# Patient Record
Sex: MALE
[De-identification: names, ages, dates, MRNs, and addresses within clinical notes are randomized per-mention and may not be internally consistent; named-entity substitution may affect disease eponyms.]

---

## 2019-12-30 ENCOUNTER — TELEPHONE (OUTPATIENT)
Dept: SCHEDULING | Age: 64
End: 2019-12-30

## 2020-01-15 ENCOUNTER — OFFICE VISIT (OUTPATIENT)
Dept: FAMILY MEDICINE CLINIC | Facility: CLINIC | Age: 65
End: 2020-01-15
Payer: COMMERCIAL

## 2020-01-15 VITALS
WEIGHT: 274 LBS | TEMPERATURE: 98 F | DIASTOLIC BLOOD PRESSURE: 83 MMHG | HEIGHT: 72.5 IN | HEART RATE: 65 BPM | SYSTOLIC BLOOD PRESSURE: 155 MMHG | BODY MASS INDEX: 36.71 KG/M2

## 2020-01-15 DIAGNOSIS — E11.9 TYPE 2 DIABETES MELLITUS WITHOUT COMPLICATION, WITHOUT LONG-TERM CURRENT USE OF INSULIN (HCC): ICD-10-CM

## 2020-01-15 DIAGNOSIS — I25.10 CORONARY ARTERY DISEASE INVOLVING NATIVE HEART WITHOUT ANGINA PECTORIS, UNSPECIFIED VESSEL OR LESION TYPE: Primary | ICD-10-CM

## 2020-01-15 PROCEDURE — 99204 OFFICE O/P NEW MOD 45 MIN: CPT | Performed by: FAMILY MEDICINE

## 2020-01-15 RX ORDER — NEBIVOLOL 10 MG/1
10 TABLET ORAL DAILY
Qty: 90 TABLET | Refills: 2 | Status: SHIPPED | OUTPATIENT
Start: 2020-01-15 | End: 2020-04-28

## 2020-01-15 RX ORDER — CLOPIDOGREL BISULFATE 75 MG/1
75 TABLET ORAL DAILY
COMMUNITY
End: 2020-01-15

## 2020-01-15 RX ORDER — TORSEMIDE 20 MG/1
20 TABLET ORAL DAILY
COMMUNITY
End: 2020-01-15

## 2020-01-15 RX ORDER — NEBIVOLOL 10 MG/1
10 TABLET ORAL DAILY
COMMUNITY
End: 2020-01-15

## 2020-01-15 RX ORDER — LISINOPRIL 20 MG/1
20 TABLET ORAL DAILY
Qty: 90 TABLET | Refills: 2 | Status: SHIPPED | OUTPATIENT
Start: 2020-01-15 | End: 2020-04-28

## 2020-01-15 RX ORDER — CLOPIDOGREL BISULFATE 75 MG/1
75 TABLET ORAL DAILY
Qty: 90 TABLET | Refills: 2 | Status: SHIPPED | OUTPATIENT
Start: 2020-01-15 | End: 2020-04-28

## 2020-01-15 RX ORDER — SIMVASTATIN 20 MG
20 TABLET ORAL NIGHTLY
COMMUNITY
End: 2020-01-15

## 2020-01-15 RX ORDER — TORSEMIDE 20 MG/1
20 TABLET ORAL DAILY
Qty: 30 TABLET | Refills: 1 | Status: SHIPPED | OUTPATIENT
Start: 2020-01-15 | End: 2020-04-28

## 2020-01-15 RX ORDER — SIMVASTATIN 20 MG
20 TABLET ORAL NIGHTLY
Qty: 90 TABLET | Refills: 3 | Status: SHIPPED | OUTPATIENT
Start: 2020-01-15 | End: 2020-04-28

## 2020-01-15 NOTE — PROGRESS NOTES
1/15/2020  12:25 PM    Ty Rodney is a 71year old male. Chief complaint(s): Patient presents with:  Diabetes  Hypertension  New Patient    HPI:     Ty Rodney primary complaint is regarding as above.      Patient Ty Rodney is a 71year old mal Date   • Diabetes [facility])    • Essential hypertension    • Prostate infection 2016      History reviewed. No pertinent surgical history.    Family History   Problem Relation Age of Onset   • Cancer Mother    • Hypertension Sister    • Lipids Sister       Soci Pulse 65   Temp 97.9 °F (36.6 °C)   Ht 6' 0.5\" (1.842 m)   Wt 274 lb (124.3 kg)   BMI 36.65 kg/m²    HENT:   Head: Normocephalic. Eyes: Conjunctivae are normal. No scleral icterus. Neck: Neck supple.    Cardiovascular: Normal rate, regular rhythm, S1 12-LEAD, Orders Placed This Encounter          Cardiology Claudia Aguilar MD           RECOMMENDATIONS given include: Please, call our office with any questions or concerns.  Notify Dr Ayaan Jewell or the [facility] if there is a deterioration or worsening total) by mouth daily. • simvastatin 20 MG Oral Tab 90 tablet 3     Sig: Take 1 tablet (20 mg total) by mouth nightly. • Nebivolol HCl (BYSTOLIC) 10 MG Oral Tab 90 tablet 2     Sig: Take 1 tablet (10 mg total) by mouth daily.    • Clopidogrel Bisulfate torsemide 20 MG Oral Tab 30 tablet 1     Sig: Take 1 tablet (20 mg total) by mouth daily. • simvastatin 20 MG Oral Tab 90 tablet 3     Sig: Take 1 tablet (20 mg total) by mouth nightly.    • Nebivolol HCl (BYSTOLIC) 10 MG Oral Tab 90 tablet 2     Sig: Lissy Nguyen

## 2020-01-23 ENCOUNTER — TELEPHONE (OUTPATIENT)
Dept: FAMILY MEDICINE CLINIC | Facility: CLINIC | Age: 65
End: 2020-01-23

## 2020-01-23 DIAGNOSIS — E11.9 TYPE 2 DIABETES MELLITUS WITHOUT COMPLICATION, UNSPECIFIED WHETHER LONG TERM INSULIN USE (HCC): Primary | ICD-10-CM

## 2020-01-28 NOTE — TELEPHONE ENCOUNTER
Rx called in to [address] for glucometer, test strips and lancet.  Pharmacist took call for a new Rx. patient was advised

## 2020-03-07 ENCOUNTER — APPOINTMENT (OUTPATIENT)
Dept: LAB | Age: 65
End: 2020-03-07
Attending: FAMILY MEDICINE
Payer: MEDICARE

## 2020-03-07 ENCOUNTER — LAB ENCOUNTER (OUTPATIENT)
Dept: LAB | Age: 65
End: 2020-03-07
Attending: FAMILY MEDICINE
Payer: MEDICARE

## 2020-03-07 DIAGNOSIS — I25.10 CORONARY ARTERY DISEASE INVOLVING NATIVE HEART WITHOUT ANGINA PECTORIS, UNSPECIFIED VESSEL OR LESION TYPE: ICD-10-CM

## 2020-03-07 DIAGNOSIS — E11.9 TYPE 2 DIABETES MELLITUS WITHOUT COMPLICATION, WITHOUT LONG-TERM CURRENT USE OF INSULIN (HCC): ICD-10-CM

## 2020-03-07 LAB
ALBUMIN SERPL-MCNC: 4.4 G/DL (ref 3.4–5)
ALBUMIN/GLOB SERPL: 1 {RATIO} (ref 1–2)
ALP LIVER SERPL-CCNC: 94 U/L (ref 45–117)
ALT SERPL-CCNC: 29 U/L (ref 16–61)
ANION GAP SERPL CALC-SCNC: 7 MMOL/L (ref 0–18)
AST SERPL-CCNC: 12 U/L (ref 15–37)
BASOPHILS # BLD AUTO: 0.06 X10(3) UL (ref 0–0.2)
BASOPHILS NFR BLD AUTO: 0.7 %
BILIRUB SERPL-MCNC: 0.4 MG/DL (ref 0.1–2)
BUN BLD-MCNC: 27 MG/DL (ref 7–18)
BUN/CREAT SERPL: 22.7 (ref 10–20)
CALCIUM BLD-MCNC: 9.7 MG/DL (ref 8.5–10.1)
CHLORIDE SERPL-SCNC: 105 MMOL/L (ref 98–112)
CHOLEST SMN-MCNC: 170 MG/DL (ref ?–200)
CO2 SERPL-SCNC: 26 MMOL/L (ref 21–32)
CREAT BLD-MCNC: 1.19 MG/DL (ref 0.7–1.3)
CREAT UR-SCNC: <13 MG/DL
DEPRECATED RDW RBC AUTO: 46.5 FL (ref 35.1–46.3)
EOSINOPHIL # BLD AUTO: 0.57 X10(3) UL (ref 0–0.7)
EOSINOPHIL NFR BLD AUTO: 6.3 %
ERYTHROCYTE [DISTWIDTH] IN BLOOD BY AUTOMATED COUNT: 14.3 % (ref 11–15)
EST. AVERAGE GLUCOSE BLD GHB EST-MCNC: 140 MG/DL (ref 68–126)
GLOBULIN PLAS-MCNC: 4.3 G/DL (ref 2.8–4.4)
GLUCOSE BLD-MCNC: 112 MG/DL (ref 70–99)
HBA1C MFR BLD HPLC: 6.5 % (ref ?–5.7)
HCT VFR BLD AUTO: 47.7 % (ref 39–53)
HDLC SERPL-MCNC: 44 MG/DL (ref 40–59)
HGB BLD-MCNC: 15.5 G/DL (ref 13–17.5)
IMM GRANULOCYTES # BLD AUTO: 0.01 X10(3) UL (ref 0–1)
IMM GRANULOCYTES NFR BLD: 0.1 %
LDLC SERPL CALC-MCNC: 102 MG/DL (ref ?–100)
LYMPHOCYTES # BLD AUTO: 2.48 X10(3) UL (ref 1–4)
LYMPHOCYTES NFR BLD AUTO: 27.3 %
M PROTEIN MFR SERPL ELPH: 8.7 G/DL (ref 6.4–8.2)
MCH RBC QN AUTO: 29.1 PG (ref 26–34)
MCHC RBC AUTO-ENTMCNC: 32.5 G/DL (ref 31–37)
MCV RBC AUTO: 89.5 FL (ref 80–100)
MICROALBUMIN UR-MCNC: <0.5 MG/DL
MONOCYTES # BLD AUTO: 0.67 X10(3) UL (ref 0.1–1)
MONOCYTES NFR BLD AUTO: 7.4 %
NEUTROPHILS # BLD AUTO: 5.28 X10 (3) UL (ref 1.5–7.7)
NEUTROPHILS # BLD AUTO: 5.28 X10(3) UL (ref 1.5–7.7)
NEUTROPHILS NFR BLD AUTO: 58.2 %
NONHDLC SERPL-MCNC: 126 MG/DL (ref ?–130)
OSMOLALITY SERPL CALC.SUM OF ELEC: 292 MOSM/KG (ref 275–295)
PATIENT FASTING Y/N/NP: YES
PATIENT FASTING Y/N/NP: YES
PLATELET # BLD AUTO: 271 10(3)UL (ref 150–450)
POTASSIUM SERPL-SCNC: 4.3 MMOL/L (ref 3.5–5.1)
RBC # BLD AUTO: 5.33 X10(6)UL (ref 3.8–5.8)
SODIUM SERPL-SCNC: 138 MMOL/L (ref 136–145)
TRIGL SERPL-MCNC: 120 MG/DL (ref 30–149)
VLDLC SERPL CALC-MCNC: 24 MG/DL (ref 0–30)
WBC # BLD AUTO: 9.1 X10(3) UL (ref 4–11)

## 2020-03-07 PROCEDURE — 80053 COMPREHEN METABOLIC PANEL: CPT

## 2020-03-07 PROCEDURE — 36415 COLL VENOUS BLD VENIPUNCTURE: CPT

## 2020-03-07 PROCEDURE — 80061 LIPID PANEL: CPT

## 2020-03-07 PROCEDURE — 83036 HEMOGLOBIN GLYCOSYLATED A1C: CPT

## 2020-03-07 PROCEDURE — 93010 ELECTROCARDIOGRAM REPORT: CPT | Performed by: FAMILY MEDICINE

## 2020-03-07 PROCEDURE — 82043 UR ALBUMIN QUANTITATIVE: CPT

## 2020-03-07 PROCEDURE — 85025 COMPLETE CBC W/AUTO DIFF WBC: CPT

## 2020-03-07 PROCEDURE — 82570 ASSAY OF URINE CREATININE: CPT

## 2020-03-07 PROCEDURE — 93005 ELECTROCARDIOGRAM TRACING: CPT

## 2020-03-16 DIAGNOSIS — I25.10 CORONARY ARTERY DISEASE INVOLVING NATIVE HEART WITHOUT ANGINA PECTORIS, UNSPECIFIED VESSEL OR LESION TYPE: ICD-10-CM

## 2020-04-28 ENCOUNTER — OFFICE VISIT (OUTPATIENT)
Dept: FAMILY MEDICINE CLINIC | Facility: CLINIC | Age: 65
End: 2020-04-28
Payer: COMMERCIAL

## 2020-04-28 VITALS
WEIGHT: 271.63 LBS | BODY MASS INDEX: 36 KG/M2 | DIASTOLIC BLOOD PRESSURE: 76 MMHG | SYSTOLIC BLOOD PRESSURE: 125 MMHG | HEART RATE: 60 BPM

## 2020-04-28 DIAGNOSIS — E11.9 TYPE 2 DIABETES MELLITUS WITHOUT COMPLICATION, UNSPECIFIED WHETHER LONG TERM INSULIN USE (HCC): Primary | ICD-10-CM

## 2020-04-28 DIAGNOSIS — I25.10 CORONARY ARTERY DISEASE INVOLVING NATIVE HEART WITHOUT ANGINA PECTORIS, UNSPECIFIED VESSEL OR LESION TYPE: ICD-10-CM

## 2020-04-28 PROCEDURE — 99213 OFFICE O/P EST LOW 20 MIN: CPT | Performed by: FAMILY MEDICINE

## 2020-04-28 RX ORDER — BLOOD SUGAR DIAGNOSTIC
1 STRIP MISCELLANEOUS 2 TIMES DAILY
Qty: 100 STRIP | Refills: 2 | Status: SHIPPED | OUTPATIENT
Start: 2020-04-28 | End: 2021-02-05

## 2020-04-28 RX ORDER — NEBIVOLOL 10 MG/1
10 TABLET ORAL DAILY
Qty: 90 TABLET | Refills: 2 | Status: SHIPPED | OUTPATIENT
Start: 2020-04-28 | End: 2020-08-24

## 2020-04-28 RX ORDER — LANCETS
1 EACH MISCELLANEOUS 2 TIMES DAILY
COMMUNITY
Start: 2020-01-28 | End: 2021-02-05

## 2020-04-28 RX ORDER — EMPAGLIFLOZIN AND METFORMIN HYDROCHLORIDE 12.5; 1 MG/1; MG/1
1 TABLET ORAL 2 TIMES DAILY
Qty: 60 TABLET | Refills: 3 | Status: SHIPPED | OUTPATIENT
Start: 2020-04-28 | End: 2020-08-24

## 2020-04-28 RX ORDER — CLOPIDOGREL BISULFATE 75 MG/1
75 TABLET ORAL DAILY
Qty: 90 TABLET | Refills: 2 | Status: SHIPPED | OUTPATIENT
Start: 2020-04-28 | End: 2021-02-05

## 2020-04-28 RX ORDER — LISINOPRIL 20 MG/1
20 TABLET ORAL DAILY
Qty: 90 TABLET | Refills: 2 | Status: SHIPPED | OUTPATIENT
Start: 2020-04-28 | End: 2020-08-24

## 2020-04-28 RX ORDER — EMPAGLIFLOZIN AND METFORMIN HYDROCHLORIDE 12.5; 1 MG/1; MG/1
TABLET ORAL 2 TIMES DAILY
COMMUNITY
Start: 2020-04-15 | End: 2020-04-28

## 2020-04-28 RX ORDER — AMLODIPINE BESYLATE 10 MG/1
10 TABLET ORAL DAILY
Qty: 90 TABLET | Refills: 2 | Status: SHIPPED | OUTPATIENT
Start: 2020-04-28 | End: 2020-08-24

## 2020-04-28 RX ORDER — SIMVASTATIN 20 MG
20 TABLET ORAL NIGHTLY
Qty: 90 TABLET | Refills: 3 | Status: SHIPPED | OUTPATIENT
Start: 2020-04-28 | End: 2020-08-24

## 2020-04-28 RX ORDER — TORSEMIDE 20 MG/1
20 TABLET ORAL DAILY
Qty: 30 TABLET | Refills: 1 | Status: SHIPPED | OUTPATIENT
Start: 2020-04-28 | End: 2020-08-26

## 2020-04-28 RX ORDER — METHOCARBAMOL 750 MG/1
750 TABLET, FILM COATED ORAL 3 TIMES DAILY
COMMUNITY
Start: 2019-04-15 | End: 2021-10-19

## 2020-04-28 RX ORDER — BLOOD SUGAR DIAGNOSTIC
1 STRIP MISCELLANEOUS 2 TIMES DAILY
COMMUNITY
Start: 2020-04-24 | End: 2020-04-28

## 2020-04-28 NOTE — PROGRESS NOTES
4/28/2020  3:56 PM    John Paul James is a 71year old male. Chief complaint(s): Patient presents with:  Medication Request: needs refills on his medications     HPI:     John Paul James primary complaint is regarding CAD, DM.      Patient John Paul James is a being active in regards to physical activity. Has not been to see cardiologist.      HISTORY:  Past Medical History:   Diagnosis Date   • Diabetes [facility])    • Essential hypertension    • Prostate infection 2016      No past surgical history on file.    Bogdan Crawford Eyes: Negative for visual disturbance. Respiratory: Negative for shortness of breath and wheezing. Cardiovascular: Negative for chest pain and palpitations. Endocrine: Negative for polydipsia and polyuria.    Musculoskeletal: Negative for back pain 37 U/L    Alkaline Phosphatase 94 45 - 117 U/L    Bilirubin, Total 0.4 0.1 - 2.0 mg/dL    Total Protein 8.7 (H) 6.4 - 8.2 g/dL    Albumin 4.4 3.4 - 5.0 g/dL    Globulin  4.3 2.8 - 4.4 g/dL    A/G Ratio 1.0 1.0 - 2.0    FASTING Yes    MICROALB/CREAT RATIO, simvastatin 20 MG Oral Tab, Take 1 tablet (20 mg total) by mouth nightly., Disp: 90 tablet, Rfl: 3  •  Nebivolol HCl (BYSTOLIC) 10 MG Oral Tab, Take 1 tablet (10 mg total) by mouth daily. , Disp: 90 tablet, Rfl: 2  •  lisinopril 20 MG Oral Tab, Take 1 table Si lancet by Finger stick route 2 (two) times daily.       RECOMMENDATIONS: instructed in use of glucometer ( check fasting glucose daily), return for training in administering insulin injections, adherence to an 1800 calorie ADA diet,  10 pound PARKER Peterson Disp Refills   • torsemide 20 MG Oral Tab 30 tablet 1     Sig: Take 1 tablet (20 mg total) by mouth daily. • simvastatin 20 MG Oral Tab 90 tablet 3     Sig: Take 1 tablet (20 mg total) by mouth nightly.    • Nebivolol HCl (BYSTOLIC) 10 MG Oral Tab 90 tabl Take 1 tablet (75 mg total) by mouth daily. • SYNJARDY 12.5-1000 MG Oral Tab 60 tablet 3     Sig: Take 1 tablet by mouth 2 (two) times daily. • amLODIPine Besylate 10 MG Oral Tab 90 tablet 2     Sig: Take 1 tablet (10 mg total) by mouth daily.    • Gluc

## 2020-05-01 RX ORDER — TORSEMIDE 20 MG/1
TABLET ORAL
Qty: 30 TABLET | Refills: 1 | OUTPATIENT
Start: 2020-05-01

## 2020-08-24 ENCOUNTER — TELEPHONE (OUTPATIENT)
Dept: FAMILY MEDICINE CLINIC | Facility: CLINIC | Age: 65
End: 2020-08-24

## 2020-08-24 DIAGNOSIS — I25.10 CORONARY ARTERY DISEASE INVOLVING NATIVE HEART WITHOUT ANGINA PECTORIS, UNSPECIFIED VESSEL OR LESION TYPE: ICD-10-CM

## 2020-08-24 RX ORDER — AMLODIPINE BESYLATE 10 MG/1
10 TABLET ORAL DAILY
Qty: 90 TABLET | Refills: 2 | Status: SHIPPED | OUTPATIENT
Start: 2020-08-24 | End: 2021-02-05

## 2020-08-24 RX ORDER — NEBIVOLOL 10 MG/1
10 TABLET ORAL DAILY
Qty: 90 TABLET | Refills: 2 | Status: SHIPPED | OUTPATIENT
Start: 2020-08-24 | End: 2021-02-05

## 2020-08-24 RX ORDER — EMPAGLIFLOZIN AND METFORMIN HYDROCHLORIDE 12.5; 1 MG/1; MG/1
1 TABLET ORAL 2 TIMES DAILY
Qty: 60 TABLET | Refills: 1 | Status: SHIPPED | OUTPATIENT
Start: 2020-08-24 | End: 2020-11-03

## 2020-08-24 RX ORDER — SIMVASTATIN 20 MG
20 TABLET ORAL NIGHTLY
Qty: 90 TABLET | Refills: 3 | Status: SHIPPED | OUTPATIENT
Start: 2020-08-24 | End: 2021-02-05

## 2020-08-24 RX ORDER — LISINOPRIL 20 MG/1
20 TABLET ORAL DAILY
Qty: 90 TABLET | Refills: 2 | Status: SHIPPED | OUTPATIENT
Start: 2020-08-24 | End: 2021-02-05

## 2020-08-26 RX ORDER — TORSEMIDE 20 MG/1
TABLET ORAL
Qty: 30 TABLET | Refills: 1 | Status: SHIPPED | OUTPATIENT
Start: 2020-08-26 | End: 2020-11-03

## 2020-11-02 DIAGNOSIS — I25.10 CORONARY ARTERY DISEASE INVOLVING NATIVE HEART WITHOUT ANGINA PECTORIS, UNSPECIFIED VESSEL OR LESION TYPE: ICD-10-CM

## 2020-11-03 RX ORDER — TORSEMIDE 20 MG/1
TABLET ORAL
Qty: 30 TABLET | Refills: 1 | Status: SHIPPED | OUTPATIENT
Start: 2020-11-03 | End: 2021-02-05

## 2020-11-03 RX ORDER — EMPAGLIFLOZIN AND METFORMIN HYDROCHLORIDE 12.5; 1 MG/1; MG/1
TABLET ORAL
Qty: 60 TABLET | Refills: 1 | Status: SHIPPED | OUTPATIENT
Start: 2020-11-03 | End: 2021-02-05

## 2020-12-09 ENCOUNTER — OFFICE VISIT (OUTPATIENT)
Dept: CARDIOLOGY CLINIC | Facility: CLINIC | Age: 65
End: 2020-12-09
Payer: COMMERCIAL

## 2020-12-09 DIAGNOSIS — I10 HYPERTENSION, UNSPECIFIED TYPE: Primary | ICD-10-CM

## 2020-12-09 DIAGNOSIS — R06.00 DYSPNEA ON EXERTION: ICD-10-CM

## 2020-12-09 PROBLEM — R06.09 DYSPNEA ON EXERTION: Status: ACTIVE | Noted: 2020-12-09

## 2020-12-09 PROCEDURE — 99205 OFFICE O/P NEW HI 60 MIN: CPT | Performed by: INTERNAL MEDICINE

## 2020-12-09 PROCEDURE — 93000 ELECTROCARDIOGRAM COMPLETE: CPT | Performed by: INTERNAL MEDICINE

## 2020-12-09 NOTE — PROGRESS NOTES
Cardiology Consult Note    12/9/2020    Bharath Bean is a 76year old male. HPI:   75 yo male hx of HTN, DM obesity. Has BRADY, 2 years ago in [place] had chest pain went to a hospital was told He had heart damage and was started on plavix. No orthopena.  Ольга Tyler GENERAL HEALTH: feels well otherwise  SKIN: denies any unusual skin lesions or rashes  RESPIRATORY:   shortness of breath with exertion  CARDIOVASCULAR: See HPI  GI: denies abdominal pain and denies heartburn  NEURO: denies headaches  All other systems r

## 2020-12-11 ENCOUNTER — TELEPHONE (OUTPATIENT)
Dept: CARDIOLOGY CLINIC | Facility: CLINIC | Age: 65
End: 2020-12-11

## 2020-12-11 NOTE — TELEPHONE ENCOUNTER
Nurse Reviewer Katy Salas from [employer] left a vm saying the case can not be auth'd at the NR level and is asking if the case can be withdrawn or should the case be forwarded to the MD for review.   The following are the reasons the case can not be auth'd

## 2020-12-17 ENCOUNTER — OFFICE VISIT (OUTPATIENT)
Dept: FAMILY MEDICINE CLINIC | Facility: CLINIC | Age: 65
End: 2020-12-17
Payer: COMMERCIAL

## 2020-12-17 VITALS
DIASTOLIC BLOOD PRESSURE: 86 MMHG | HEART RATE: 55 BPM | SYSTOLIC BLOOD PRESSURE: 139 MMHG | HEIGHT: 72.5 IN | WEIGHT: 278 LBS | BODY MASS INDEX: 37.25 KG/M2

## 2020-12-17 DIAGNOSIS — G47.31 PRIMARY CENTRAL SLEEP APNEA: ICD-10-CM

## 2020-12-17 DIAGNOSIS — I10 ESSENTIAL HYPERTENSION: ICD-10-CM

## 2020-12-17 DIAGNOSIS — G56.02 CARPAL TUNNEL SYNDROME OF LEFT WRIST: Primary | ICD-10-CM

## 2020-12-17 PROCEDURE — 3079F DIAST BP 80-89 MM HG: CPT | Performed by: FAMILY MEDICINE

## 2020-12-17 PROCEDURE — 3008F BODY MASS INDEX DOCD: CPT | Performed by: FAMILY MEDICINE

## 2020-12-17 PROCEDURE — 99214 OFFICE O/P EST MOD 30 MIN: CPT | Performed by: FAMILY MEDICINE

## 2020-12-17 PROCEDURE — 3075F SYST BP GE 130 - 139MM HG: CPT | Performed by: FAMILY MEDICINE

## 2020-12-17 NOTE — PROGRESS NOTES
12/17/2020  11:12 AM    Enid Bhatt is a 76year old male. Chief complaint(s): Patient presents with:  Wrist Pain: x 1 month left wrist and hand pain  apnea  HPI:     Enid Bhatt primary complaint is regarding multiple complainbts.      Patient is a total) by mouth nightly. 90 tablet 3   • Nebivolol HCl (BYSTOLIC) 10 MG Oral Tab Take 1 tablet (10 mg total) by mouth daily. 90 tablet 2   • lisinopril 20 MG Oral Tab Take 1 tablet (20 mg total) by mouth daily.  90 tablet 2   • amLODIPine Besylate 10 MG Ora wrist  (primary encounter diagnosis)  Primary central sleep apnea  Essential hypertension    1.  Carpal tunnel syndrome of left wrist    MEDICATIONS:     Requested Prescriptions     Signed Prescriptions Disp Refills   • Diclofenac Sodium 50 MG Oral Tab EC 2 take medication as prescribed, try not to miss doses, smoking cessation, weight loss, and stress reduction. FOLLOW-UP: Schedule a follow-up visit in [address] [place] [address]. Orders This Visit:  No orders of the defined types were placed in this encounter.       Me

## 2021-01-04 ENCOUNTER — TELEPHONE (OUTPATIENT)
Dept: FAMILY MEDICINE CLINIC | Facility: CLINIC | Age: 66
End: 2021-01-04

## 2021-01-04 DIAGNOSIS — G56.02 CARPAL TUNNEL SYNDROME OF LEFT WRIST: Primary | ICD-10-CM

## 2021-01-04 NOTE — TELEPHONE ENCOUNTER
Dr. Andreina Howe, patient was told by Neurology department to request a referral in order to have EMG completed.  Please advise

## 2021-01-05 NOTE — TELEPHONE ENCOUNTER
Dr. Gina Christianson, patient was told by Neurology to see specialist first for EMG. Referral is needed.

## 2021-01-05 NOTE — TELEPHONE ENCOUNTER
Good morning, So we don't need to do a separate referral for consultation in addition to the EMG test itself. I have done this, this way for the past 5 years. Spoke with the Neurology Department and I was reassured that it is not necessary.  They can just d

## 2021-01-06 ENCOUNTER — HOSPITAL ENCOUNTER (OUTPATIENT)
Dept: CV DIAGNOSTICS | Facility: HOSPITAL | Age: 66
Discharge: HOME | End: 2021-01-06
Attending: INTERNAL MEDICINE
Payer: MEDICARE

## 2021-01-06 DIAGNOSIS — I10 HYPERTENSION, UNSPECIFIED TYPE: ICD-10-CM

## 2021-01-06 DIAGNOSIS — R06.00 DYSPNEA ON EXERTION: ICD-10-CM

## 2021-01-06 PROCEDURE — 93306 TTE W/DOPPLER COMPLETE: CPT | Performed by: INTERNAL MEDICINE

## 2021-01-07 ENCOUNTER — OFFICE VISIT (OUTPATIENT)
Dept: SLEEP CENTER | Age: 66
End: 2021-01-07
Attending: FAMILY MEDICINE
Payer: MEDICARE

## 2021-01-07 DIAGNOSIS — G47.31 PRIMARY CENTRAL SLEEP APNEA: ICD-10-CM

## 2021-01-07 PROCEDURE — 95806 SLEEP STUDY UNATT&RESP EFFT: CPT

## 2021-01-09 ENCOUNTER — HOSPITAL ENCOUNTER (OUTPATIENT)
Dept: CV DIAGNOSTICS | Facility: HOSPITAL | Age: 66
Discharge: HOME | End: 2021-01-09
Attending: INTERNAL MEDICINE
Payer: MEDICARE

## 2021-01-09 ENCOUNTER — HOSPITAL ENCOUNTER (OUTPATIENT)
Dept: NUCLEAR MEDICINE | Facility: HOSPITAL | Age: 66
Discharge: HOME | End: 2021-01-09
Attending: INTERNAL MEDICINE
Payer: MEDICARE

## 2021-01-09 DIAGNOSIS — R06.00 DYSPNEA ON EXERTION: ICD-10-CM

## 2021-01-09 PROCEDURE — 93017 CV STRESS TEST TRACING ONLY: CPT | Performed by: INTERNAL MEDICINE

## 2021-01-09 PROCEDURE — 78452 HT MUSCLE IMAGE SPECT MULT: CPT | Performed by: INTERNAL MEDICINE

## 2021-01-09 PROCEDURE — 93016 CV STRESS TEST SUPVJ ONLY: CPT | Performed by: INTERNAL MEDICINE

## 2021-01-09 PROCEDURE — 93018 CV STRESS TEST I&R ONLY: CPT | Performed by: INTERNAL MEDICINE

## 2021-01-10 NOTE — PROCEDURES
[facility]  Accredited by the [employer] (AASM)    PATIENT'S NAME: [email or web address]   ATTENDING PHYSICIAN: Cherise Hutton MD   REFERRING PHYSICIAN: Cherise Hutton MD   PATIENT ACCOUNT #: [de-identified] LOCATION: [facility] The data generated from this study is consistent with very severe obstructive sleep apnea (ICD-10 code G47.33). RECOMMENDATIONS:    1. CPAP titration. 2.   Weight loss. 3.   Avoid alcohol. 4.   Avoid sedating drug.   5.   The patient should not driv

## 2021-01-11 ENCOUNTER — TELEPHONE (OUTPATIENT)
Dept: FAMILY MEDICINE CLINIC | Facility: CLINIC | Age: 66
End: 2021-01-11

## 2021-01-11 DIAGNOSIS — G47.33 OSA (OBSTRUCTIVE SLEEP APNEA): Primary | ICD-10-CM

## 2021-01-11 NOTE — TELEPHONE ENCOUNTER
Candy/  of [facility] ([id number] [facility]) is requesting that order for home sleep study be updated to CPAP Titration Sleep Study for patient.

## 2021-01-13 ENCOUNTER — TELEPHONE (OUTPATIENT)
Dept: CARDIOLOGY CLINIC | Facility: CLINIC | Age: 66
End: 2021-01-13

## 2021-01-13 NOTE — TELEPHONE ENCOUNTER
Rashaad Gregory APRN P Em Cardio Clinical Staff             Stress test was normal, continue present management. Spoke with  Adrian Thrasher, discussed normal test result and instruction to continue present management.  Reminded of follow up appoi

## 2021-01-28 ENCOUNTER — ORDER TRANSCRIPTION (OUTPATIENT)
Dept: SLEEP CENTER | Age: 66
End: 2021-01-28

## 2021-01-28 ENCOUNTER — PROCEDURE VISIT (OUTPATIENT)
Dept: NEUROLOGY | Facility: CLINIC | Age: 66
End: 2021-01-28
Payer: MEDICARE

## 2021-01-28 VITALS — HEIGHT: 75 IN | WEIGHT: 278 LBS | BODY MASS INDEX: 34.57 KG/M2

## 2021-01-28 DIAGNOSIS — G47.33 OBSTRUCTIVE SLEEP APNEA (ADULT) (PEDIATRIC): Primary | ICD-10-CM

## 2021-01-28 DIAGNOSIS — G56.22 ULNAR NEUROPATHY AT WRIST, LEFT: Primary | ICD-10-CM

## 2021-01-28 PROCEDURE — 95886 MUSC TEST DONE W/N TEST COMP: CPT | Performed by: PHYSICAL MEDICINE & REHABILITATION

## 2021-01-28 PROCEDURE — 3008F BODY MASS INDEX DOCD: CPT | Performed by: PHYSICAL MEDICINE & REHABILITATION

## 2021-01-28 PROCEDURE — 95909 NRV CNDJ TST 5-6 STUDIES: CPT | Performed by: PHYSICAL MEDICINE & REHABILITATION

## 2021-01-28 NOTE — PROGRESS NOTES
[address]  Electromyography Consultation      History of Present Illness:    Dear Dr. Diann Arndt,  Thank you for the opportunity to see Shun Aguillon for electrodiagnostic consultation today.  As you know the • Accu-Chek Softclix Lancets Does not apply Misc 1 lancet by Finger stick route 2 (two) times daily. • methocarbamol 750 MG Oral Tab Take 750 mg by mouth 3 (three) times daily.      • Clopidogrel Bisulfate (PLAVIX) 75 MG Oral Tab Take 1 tablet (75 mg to Ref.  Ref. 4.40 4.0          Elbow 22 Elbow - Wrist 7.60 7.2 55.7 91.5 5.90 27.2      Ref. Ref. 49.0      L ULNAR - ADM      Wrist 8 Wrist - ADM 2.70 9.1  100 6.00 27.9      Ref. Ref. 4.20 5.0          B. Elbow 24.5 B. Elbow - Wrist 6.85 8.3 59.0 90.9 1. Ulnar neuropathy at wrist, left  The EMG does not support carpal tunnel syndrome or cervical radiculopathy. He has ulnar neuropathy at the wrist with sensory only findings which has been substantially improved with diclofenac.   There is no motor involv

## 2021-02-01 DIAGNOSIS — Z23 NEED FOR VACCINATION: ICD-10-CM

## 2021-02-05 ENCOUNTER — OFFICE VISIT (OUTPATIENT)
Dept: FAMILY MEDICINE CLINIC | Facility: CLINIC | Age: 66
End: 2021-02-05
Payer: COMMERCIAL

## 2021-02-05 VITALS
BODY MASS INDEX: 36.44 KG/M2 | DIASTOLIC BLOOD PRESSURE: 81 MMHG | SYSTOLIC BLOOD PRESSURE: 128 MMHG | HEIGHT: 72.5 IN | WEIGHT: 272 LBS | HEART RATE: 62 BPM

## 2021-02-05 DIAGNOSIS — I10 ESSENTIAL HYPERTENSION: ICD-10-CM

## 2021-02-05 DIAGNOSIS — I25.10 CORONARY ARTERY DISEASE INVOLVING NATIVE HEART WITHOUT ANGINA PECTORIS, UNSPECIFIED VESSEL OR LESION TYPE: ICD-10-CM

## 2021-02-05 DIAGNOSIS — E11.9 TYPE 2 DIABETES MELLITUS WITHOUT COMPLICATION, UNSPECIFIED WHETHER LONG TERM INSULIN USE (HCC): Primary | ICD-10-CM

## 2021-02-05 PROCEDURE — 3074F SYST BP LT 130 MM HG: CPT | Performed by: FAMILY MEDICINE

## 2021-02-05 PROCEDURE — 99214 OFFICE O/P EST MOD 30 MIN: CPT | Performed by: FAMILY MEDICINE

## 2021-02-05 PROCEDURE — 3008F BODY MASS INDEX DOCD: CPT | Performed by: FAMILY MEDICINE

## 2021-02-05 PROCEDURE — 3079F DIAST BP 80-89 MM HG: CPT | Performed by: FAMILY MEDICINE

## 2021-02-05 RX ORDER — SIMVASTATIN 20 MG
20 TABLET ORAL NIGHTLY
Qty: 90 TABLET | Refills: 3 | Status: SHIPPED | OUTPATIENT
Start: 2021-02-05 | End: 2021-05-11

## 2021-02-05 RX ORDER — AMLODIPINE BESYLATE 10 MG/1
10 TABLET ORAL DAILY
Qty: 90 TABLET | Refills: 2 | Status: SHIPPED | OUTPATIENT
Start: 2021-02-05 | End: 2021-05-11

## 2021-02-05 RX ORDER — EMPAGLIFLOZIN AND METFORMIN HYDROCHLORIDE 12.5; 1 MG/1; MG/1
1 TABLET ORAL 2 TIMES DAILY
Qty: 60 TABLET | Refills: 3 | Status: SHIPPED | OUTPATIENT
Start: 2021-02-05 | End: 2021-05-11

## 2021-02-05 RX ORDER — TORSEMIDE 20 MG/1
20 TABLET ORAL DAILY
Qty: 30 TABLET | Refills: 3 | Status: SHIPPED | OUTPATIENT
Start: 2021-02-05 | End: 2021-05-11

## 2021-02-05 RX ORDER — LISINOPRIL 20 MG/1
20 TABLET ORAL DAILY
Qty: 90 TABLET | Refills: 2 | Status: SHIPPED | OUTPATIENT
Start: 2021-02-05 | End: 2021-05-11

## 2021-02-05 RX ORDER — BLOOD SUGAR DIAGNOSTIC
1 STRIP MISCELLANEOUS 2 TIMES DAILY
Qty: 100 STRIP | Refills: 2 | Status: SHIPPED | OUTPATIENT
Start: 2021-02-05

## 2021-02-05 RX ORDER — LANCETS
1 EACH MISCELLANEOUS 2 TIMES DAILY
Qty: 100 EACH | Refills: 2 | Status: SHIPPED | OUTPATIENT
Start: 2021-02-05

## 2021-02-05 RX ORDER — NEBIVOLOL 10 MG/1
10 TABLET ORAL DAILY
Qty: 90 TABLET | Refills: 2 | Status: SHIPPED | OUTPATIENT
Start: 2021-02-05 | End: 2021-05-11

## 2021-02-05 RX ORDER — CLOPIDOGREL BISULFATE 75 MG/1
75 TABLET ORAL DAILY
Qty: 90 TABLET | Refills: 2 | Status: SHIPPED | OUTPATIENT
Start: 2021-02-05 | End: 2021-05-11

## 2021-02-05 NOTE — PROGRESS NOTES
2/5/2021  10:51 AM    Jason Garrison is a 76year old male. Chief complaint(s): Patient presents with:  Diabetes    HPI:     Jason Garrison primary complaint is regarding as above.      Patient Tan Beasley is a 76year old male is here to be evaluated fo activity. Has been to see cardiologist.    HISTORY:  Past Medical History:   Diagnosis Date   • Diabetes [facility])    • Essential hypertension    • Prostate infection 2016      No past surgical history on file.    Family History   Problem Relation Age of Onset Review of Systems   Constitutional: Negative for chills, fatigue and fever. Eyes: Negative for visual disturbance. Respiratory: Negative for shortness of breath and wheezing. Cardiovascular: Negative for chest pain and palpitations.    Endocrine: N FRACTION: 65 %  PERFUSION IMAGING: The myocardial perfusion images post pharmacologic stress demonstrate normal myocardial perfusion. The resting images demonstrate normal myocardial perfusion.           CONCLUSION: Normal regadenoson (Lexiscan) myocardial study was available for comparison. ------------------------------------------------------------------- Study data:  No prior study was available for comparison. Study status:  Elective. Procedure:  Transthoracic echocardiography.  The study was technical was no evidence for stenosis. No regurgitation. Tricuspid valve:   Structurally normal valve. Mobility was not restricted. Doppler: There was no evidence for stenosis. Trivial regurgitation. Pulmonary artery:   Normal pulmonary artery pressure.  Right ---------  Stroke index (SV/bsa), LVOT DP              38    ml/m^2 ---------   Aorta                                       Value        Reference  Aortic root ID                              3.9   cm     &lt;4.6   Left atrium insulin use ([facility] [address]  DIABETES A&P    LABORATORY & ORDERS: Blood test(s) ordered today ; Orders Placed This Encounter      ** Hemoglobin A1C  [E]      ** Lipid Panel [E]      **CBC W Differential W Platelet [E]      **CMP  Comp Metabolic Panel (14) [E]      * mouth daily. • lisinopril 20 MG Oral Tab 90 tablet 2     Sig: Take 1 tablet (20 mg total) by mouth daily. • Nebivolol HCl (BYSTOLIC) 10 MG Oral Tab 90 tablet 2     Sig: Take 1 tablet (10 mg total) by mouth daily.    • simvastatin 20 MG Oral Tab 90 table hypertension    MEDICATIONS:   Requested Prescriptions     Signed Prescriptions Disp Refills   • amLODIPine Besylate 10 MG Oral Tab 90 tablet 2     Sig: Take 1 tablet (10 mg total) by mouth daily.    • Clopidogrel Bisulfate (PLAVIX) 75 MG Oral Tab 90 tablet Microalb/Creat Ratio, Random Urine      Meds This Visit:  Requested Prescriptions     Signed Prescriptions Disp Refills   • amLODIPine Besylate 10 MG Oral Tab 90 tablet 2     Sig: Take 1 tablet (10 mg total) by mouth daily.    • Clopidogrel Bisulfate (PLAVI

## 2021-02-10 ENCOUNTER — OFFICE VISIT (OUTPATIENT)
Dept: CARDIOLOGY CLINIC | Facility: CLINIC | Age: 66
End: 2021-02-10
Payer: COMMERCIAL

## 2021-02-10 DIAGNOSIS — R06.00 DYSPNEA ON EXERTION: ICD-10-CM

## 2021-02-10 DIAGNOSIS — I10 HYPERTENSION, UNSPECIFIED TYPE: Primary | ICD-10-CM

## 2021-02-10 PROCEDURE — 99214 OFFICE O/P EST MOD 30 MIN: CPT | Performed by: INTERNAL MEDICINE

## 2021-02-10 NOTE — PROGRESS NOTES
Cardiology Follow Up    Waqar Junior is a 76year old male. Patient presents with: Follow - Up  Hypertension    HPI:   50-year-old male prior history of diabetes, hypertension hyperlipidemia questionable CAD here for follow-up visit.   Last visit ordered n Diabetes [facility])    • Essential hypertension    • Prostate infection 2016      Social History:  Social History    Tobacco Use      Smoking status: Former Smoker      Smokeless tobacco: Never Used    Alcohol use: Yes      Frequency: Monthly or less      Comme

## 2021-02-11 ENCOUNTER — IMMUNIZATION (OUTPATIENT)
Dept: LAB | Age: 66
End: 2021-02-11
Attending: HOSPITALIST
Payer: MEDICARE

## 2021-02-11 DIAGNOSIS — Z23 NEED FOR VACCINATION: Primary | ICD-10-CM

## 2021-02-11 PROCEDURE — 0001A SARSCOV2 VAC 30MCG/0.3ML IM: CPT

## 2021-03-01 ENCOUNTER — LAB ENCOUNTER (OUTPATIENT)
Dept: LAB | Age: 66
End: 2021-03-01
Attending: FAMILY MEDICINE
Payer: MEDICARE

## 2021-03-01 DIAGNOSIS — G47.33 OBSTRUCTIVE SLEEP APNEA (ADULT) (PEDIATRIC): ICD-10-CM

## 2021-03-01 LAB — SARS-COV-2 RNA RESP QL NAA+PROBE: NOT DETECTED

## 2021-03-03 ENCOUNTER — OFFICE VISIT (OUTPATIENT)
Dept: SLEEP CENTER | Age: 66
End: 2021-03-03
Attending: FAMILY MEDICINE
Payer: MEDICARE

## 2021-03-03 DIAGNOSIS — G47.33 OBSTRUCTIVE SLEEP APNEA SYNDROME: Primary | ICD-10-CM

## 2021-03-03 PROCEDURE — 95811 POLYSOM 6/>YRS CPAP 4/> PARM: CPT

## 2021-03-05 ENCOUNTER — IMMUNIZATION (OUTPATIENT)
Dept: LAB | Age: 66
End: 2021-03-05
Attending: HOSPITALIST
Payer: MEDICARE

## 2021-03-05 DIAGNOSIS — Z23 NEED FOR VACCINATION: Primary | ICD-10-CM

## 2021-03-05 PROCEDURE — 0002A SARSCOV2 VAC 30MCG/0.3ML IM: CPT

## 2021-03-07 NOTE — PROCEDURES
[facility]  Accredited by the [employer] (AASM)    PATIENT'S NAME: [email or web address]   ATTENDING PHYSICIAN: Cris Reyna MD   REFERRING PHYSICIAN: Cris Reyna MD   PATIENT ACCOUNT #: [de-identified] LOCATION: [facility] at 5 CWP and titrated upward to 7 CWP. On that final value, the apnea plus hypopnea index was 28.8 events per hour and the lowest desaturation was to 87%. Sleep architecture was severely fragmented with multiple awakenings after sleep onset.   After about

## 2021-03-12 ENCOUNTER — TELEPHONE (OUTPATIENT)
Dept: FAMILY MEDICINE CLINIC | Facility: CLINIC | Age: 66
End: 2021-03-12

## 2021-03-12 DIAGNOSIS — G47.33 OSA (OBSTRUCTIVE SLEEP APNEA): Primary | ICD-10-CM

## 2021-03-12 NOTE — TELEPHONE ENCOUNTER
DME order has been corrected. Demographics, Insurance, DME order, results and office notes faxed to [place].

## 2021-03-12 NOTE — TELEPHONE ENCOUNTER
----- Message from Blade Barraza RN sent at 3/11/2021  6:51 PM CST -----    ----- Message -----  From: Luis Dodson MD  Sent: 3/7/2021   8:27 PM CST  To: Em Rn Triage, Em Fm Ad[facility] Lpn/Cma    Please call patient, Auto CPAP machine oredered.

## 2021-05-11 ENCOUNTER — LAB ENCOUNTER (OUTPATIENT)
Dept: LAB | Age: 66
End: 2021-05-11
Attending: FAMILY MEDICINE
Payer: MEDICARE

## 2021-05-11 ENCOUNTER — OFFICE VISIT (OUTPATIENT)
Dept: FAMILY MEDICINE CLINIC | Facility: CLINIC | Age: 66
End: 2021-05-11
Payer: COMMERCIAL

## 2021-05-11 VITALS
BODY MASS INDEX: 37 KG/M2 | SYSTOLIC BLOOD PRESSURE: 130 MMHG | DIASTOLIC BLOOD PRESSURE: 75 MMHG | WEIGHT: 279 LBS | HEART RATE: 60 BPM

## 2021-05-11 DIAGNOSIS — I25.10 CORONARY ARTERY DISEASE INVOLVING NATIVE HEART WITHOUT ANGINA PECTORIS, UNSPECIFIED VESSEL OR LESION TYPE: ICD-10-CM

## 2021-05-11 DIAGNOSIS — E11.9 TYPE 2 DIABETES MELLITUS WITHOUT COMPLICATION, WITHOUT LONG-TERM CURRENT USE OF INSULIN (HCC): Primary | ICD-10-CM

## 2021-05-11 DIAGNOSIS — E11.9 TYPE 2 DIABETES MELLITUS WITHOUT COMPLICATION, UNSPECIFIED WHETHER LONG TERM INSULIN USE (HCC): ICD-10-CM

## 2021-05-11 PROBLEM — E66.01 SEVERE OBESITY (BMI 35.0-39.9) WITH COMORBIDITY (HCC): Chronic | Status: ACTIVE | Noted: 2021-05-11

## 2021-05-11 PROCEDURE — 3075F SYST BP GE 130 - 139MM HG: CPT | Performed by: FAMILY MEDICINE

## 2021-05-11 PROCEDURE — 83036 HEMOGLOBIN GLYCOSYLATED A1C: CPT | Performed by: FAMILY MEDICINE

## 2021-05-11 PROCEDURE — 82043 UR ALBUMIN QUANTITATIVE: CPT

## 2021-05-11 PROCEDURE — 83036 HEMOGLOBIN GLYCOSYLATED A1C: CPT

## 2021-05-11 PROCEDURE — 36416 COLLJ CAPILLARY BLOOD SPEC: CPT | Performed by: FAMILY MEDICINE

## 2021-05-11 PROCEDURE — 3044F HG A1C LEVEL LT 7.0%: CPT | Performed by: FAMILY MEDICINE

## 2021-05-11 PROCEDURE — 36415 COLL VENOUS BLD VENIPUNCTURE: CPT

## 2021-05-11 PROCEDURE — 80061 LIPID PANEL: CPT

## 2021-05-11 PROCEDURE — 80053 COMPREHEN METABOLIC PANEL: CPT

## 2021-05-11 PROCEDURE — 85025 COMPLETE CBC W/AUTO DIFF WBC: CPT

## 2021-05-11 PROCEDURE — 99213 OFFICE O/P EST LOW 20 MIN: CPT | Performed by: FAMILY MEDICINE

## 2021-05-11 PROCEDURE — 3061F NEG MICROALBUMINURIA REV: CPT | Performed by: FAMILY MEDICINE

## 2021-05-11 PROCEDURE — 3078F DIAST BP <80 MM HG: CPT | Performed by: FAMILY MEDICINE

## 2021-05-11 PROCEDURE — 82570 ASSAY OF URINE CREATININE: CPT

## 2021-05-11 RX ORDER — LISINOPRIL 20 MG/1
20 TABLET ORAL DAILY
Qty: 90 TABLET | Refills: 2 | Status: SHIPPED | OUTPATIENT
Start: 2021-05-11 | End: 2021-10-19

## 2021-05-11 RX ORDER — AMLODIPINE BESYLATE 10 MG/1
10 TABLET ORAL DAILY
Qty: 90 TABLET | Refills: 2 | Status: SHIPPED | OUTPATIENT
Start: 2021-05-11 | End: 2021-10-19

## 2021-05-11 RX ORDER — NEBIVOLOL 10 MG/1
10 TABLET ORAL DAILY
Qty: 90 TABLET | Refills: 2 | Status: SHIPPED | OUTPATIENT
Start: 2021-05-11 | End: 2021-10-19

## 2021-05-11 RX ORDER — EMPAGLIFLOZIN AND METFORMIN HYDROCHLORIDE 12.5; 1 MG/1; MG/1
1 TABLET ORAL 2 TIMES DAILY
Qty: 180 TABLET | Refills: 3 | Status: SHIPPED | OUTPATIENT
Start: 2021-05-11 | End: 2021-10-19

## 2021-05-11 RX ORDER — SIMVASTATIN 20 MG
20 TABLET ORAL NIGHTLY
Qty: 90 TABLET | Refills: 3 | Status: SHIPPED | OUTPATIENT
Start: 2021-05-11 | End: 2021-10-19

## 2021-05-11 RX ORDER — CLOPIDOGREL BISULFATE 75 MG/1
75 TABLET ORAL DAILY
Qty: 90 TABLET | Refills: 2 | Status: SHIPPED | OUTPATIENT
Start: 2021-05-11 | End: 2021-10-19

## 2021-05-11 RX ORDER — TORSEMIDE 20 MG/1
20 TABLET ORAL DAILY
Qty: 90 TABLET | Refills: 3 | Status: SHIPPED | OUTPATIENT
Start: 2021-05-11 | End: 2021-10-19

## 2021-05-11 NOTE — PROGRESS NOTES
5/11/2021  9:53 AM    Jason Garrison is a 76year old male. Chief complaint(s): Patient presents with:  Diabetes: f/u -needs 90 day supply of meds traveling to [facility]     HPI:     Jason Garrison primary complaint is regarding Diabetes.      Patient Zygmunt Medications (Active prior to today's visit):  Current Outpatient Medications   Medication Sig Dispense Refill   • amLODIPine Besylate 10 MG Oral Tab Take 1 tablet (10 mg total) by mouth daily.  90 tablet 2   • Clopidogrel Bisulfate (PLAVIX) 75 MG Oral T (126.6 kg)   BMI 37.32 kg/m²     Physical Exam  Vitals reviewed. Constitutional:       Appearance: Normal appearance. He is well-developed. HENT:      Head: Normocephalic. Eyes:      General: No scleral icterus.      Conjunctiva/sclera: Conjunctivae n mouth 3 (three) times daily. , Disp: 270 tablet, Rfl: 3  •  Nebivolol HCl (BYSTOLIC) 10 MG Oral Tab, Take 1 tablet (10 mg total) by mouth daily. , Disp: 90 tablet, Rfl: 2  •  simvastatin 20 MG Oral Tab, Take 1 tablet (20 mg total) by mouth nightly., Disp: 90 Catalina Barajas with Cardiologist    RECOMMENDATIONS: instructed in use of glucometer ( check fasting glucose daily), return for training in administering insulin injections, adherence to an 1800 calorie ADA diet,  9 pound weight loss, a graduated exercise pr

## 2021-10-19 ENCOUNTER — OFFICE VISIT (OUTPATIENT)
Dept: FAMILY MEDICINE CLINIC | Facility: CLINIC | Age: 66
End: 2021-10-19
Payer: MEDICARE

## 2021-10-19 VITALS
HEART RATE: 60 BPM | BODY MASS INDEX: 37.75 KG/M2 | SYSTOLIC BLOOD PRESSURE: 134 MMHG | WEIGHT: 284.81 LBS | DIASTOLIC BLOOD PRESSURE: 81 MMHG | HEIGHT: 73 IN

## 2021-10-19 DIAGNOSIS — I25.10 CORONARY ARTERY DISEASE INVOLVING NATIVE HEART WITHOUT ANGINA PECTORIS, UNSPECIFIED VESSEL OR LESION TYPE: ICD-10-CM

## 2021-10-19 DIAGNOSIS — E11.9 TYPE 2 DIABETES MELLITUS WITHOUT COMPLICATION, WITHOUT LONG-TERM CURRENT USE OF INSULIN (HCC): Primary | ICD-10-CM

## 2021-10-19 PROCEDURE — 99214 OFFICE O/P EST MOD 30 MIN: CPT | Performed by: FAMILY MEDICINE

## 2021-10-19 PROCEDURE — 3008F BODY MASS INDEX DOCD: CPT | Performed by: FAMILY MEDICINE

## 2021-10-19 PROCEDURE — 83036 HEMOGLOBIN GLYCOSYLATED A1C: CPT | Performed by: FAMILY MEDICINE

## 2021-10-19 PROCEDURE — 3044F HG A1C LEVEL LT 7.0%: CPT | Performed by: FAMILY MEDICINE

## 2021-10-19 PROCEDURE — 3079F DIAST BP 80-89 MM HG: CPT | Performed by: FAMILY MEDICINE

## 2021-10-19 PROCEDURE — 3075F SYST BP GE 130 - 139MM HG: CPT | Performed by: FAMILY MEDICINE

## 2021-10-19 RX ORDER — METHOCARBAMOL 750 MG/1
750 TABLET, FILM COATED ORAL 3 TIMES DAILY
Qty: 90 TABLET | Refills: 1 | Status: SHIPPED | OUTPATIENT
Start: 2021-10-19

## 2021-10-19 RX ORDER — TORSEMIDE 20 MG/1
20 TABLET ORAL DAILY
Qty: 90 TABLET | Refills: 3 | Status: SHIPPED | OUTPATIENT
Start: 2021-10-19

## 2021-10-19 RX ORDER — NEBIVOLOL 10 MG/1
10 TABLET ORAL DAILY
Qty: 90 TABLET | Refills: 2 | Status: SHIPPED | OUTPATIENT
Start: 2021-10-19

## 2021-10-19 RX ORDER — CLOPIDOGREL BISULFATE 75 MG/1
75 TABLET ORAL DAILY
Qty: 90 TABLET | Refills: 2 | Status: SHIPPED | OUTPATIENT
Start: 2021-10-19

## 2021-10-19 RX ORDER — LISINOPRIL 20 MG/1
20 TABLET ORAL DAILY
Qty: 90 TABLET | Refills: 2 | Status: SHIPPED | OUTPATIENT
Start: 2021-10-19

## 2021-10-19 RX ORDER — AMLODIPINE BESYLATE 10 MG/1
10 TABLET ORAL DAILY
Qty: 90 TABLET | Refills: 2 | Status: SHIPPED | OUTPATIENT
Start: 2021-10-19

## 2021-10-19 RX ORDER — EMPAGLIFLOZIN AND METFORMIN HYDROCHLORIDE 12.5; 1 MG/1; MG/1
1 TABLET ORAL 2 TIMES DAILY
Qty: 180 TABLET | Refills: 3 | Status: SHIPPED | OUTPATIENT
Start: 2021-10-19

## 2021-10-19 RX ORDER — SIMVASTATIN 20 MG
20 TABLET ORAL NIGHTLY
Qty: 90 TABLET | Refills: 3 | Status: SHIPPED | OUTPATIENT
Start: 2021-10-19

## 2021-10-19 NOTE — PROGRESS NOTES
10/19/2021  10:17 AM    Yojana Calderón is a 78year old male. Chief complaint(s): Patient presents with:  Medication Follow-Up: Refills   Diabetes, CAD  HPI:     Yojana Calderón primary complaint is regarding as above.      Jacki mercado [id number] activity. Has been to see cardiologist.     HISTORY:  Past Medical History:   Diagnosis Date   • Diabetes [facility])    • Essential hypertension    • Prostate infection 2016      History reviewed. No pertinent surgical history.    Family History   Problem Relati amLODIPine Besylate 10 MG TABS 10 mg, lisinopril 20 MG TABS 20 mg Take by mouth daily. Allergies:  No Known Allergies      ROS:   Review of Systems   Constitutional: Negative for appetite change, fatigue and fever.    Respiratory: Negative for short unspecified vessel or lesion type    1.  Type 2 diabetes mellitus without complication, without long-term current use of insulin (HCC)  DIABETES A&P    LABORATORY & ORDERS: Blood test(s) ordered today ; Orders Placed This Encounter      POC Glycohemoglobin 90 tablet 2     Sig: Take 1 tablet (20 mg total) by mouth daily. • nebivolol (BYSTOLIC) 10 MG Oral Tab 90 tablet 2     Sig: Take 1 tablet (10 mg total) by mouth daily.    • simvastatin 20 MG Oral Tab 90 tablet 3     Sig: Take 1 tablet (20 mg total) by nancy daily., Disp: 90 tablet, Rfl: 2  •  lisinopril 20 MG Oral Tab, Take 1 tablet (20 mg total) by mouth daily. , Disp: 90 tablet, Rfl: 2  •  nebivolol (BYSTOLIC) 10 MG Oral Tab, Take 1 tablet (10 mg total) by mouth daily. , Disp: 90 tablet, Rfl: 2  •  simvastati daily.   • methocarbamol 750 MG Oral Tab 90 tablet 1     Sig: Take 1 tablet (750 mg total) by mouth 3 (three) times daily.      REFERRALS: KPA with Cardiologist        RECOMMENDATIONS given include: Patient was reassured of  his medical condition and all qu

## 2021-11-02 ENCOUNTER — LAB ENCOUNTER (OUTPATIENT)
Dept: LAB | Age: 66
End: 2021-11-02
Attending: FAMILY MEDICINE
Payer: MEDICARE

## 2021-11-02 ENCOUNTER — OFFICE VISIT (OUTPATIENT)
Dept: FAMILY MEDICINE CLINIC | Facility: CLINIC | Age: 66
End: 2021-11-02
Payer: MEDICARE

## 2021-11-02 VITALS
BODY MASS INDEX: 38.43 KG/M2 | SYSTOLIC BLOOD PRESSURE: 137 MMHG | HEIGHT: 73 IN | DIASTOLIC BLOOD PRESSURE: 76 MMHG | WEIGHT: 290 LBS | HEART RATE: 63 BPM

## 2021-11-02 DIAGNOSIS — E11.9 TYPE 2 DIABETES MELLITUS WITHOUT COMPLICATION, WITHOUT LONG-TERM CURRENT USE OF INSULIN (HCC): ICD-10-CM

## 2021-11-02 DIAGNOSIS — G56.02 CARPAL TUNNEL SYNDROME OF LEFT WRIST: ICD-10-CM

## 2021-11-02 DIAGNOSIS — G47.31 PRIMARY CENTRAL SLEEP APNEA: ICD-10-CM

## 2021-11-02 DIAGNOSIS — G47.33 OSA (OBSTRUCTIVE SLEEP APNEA): ICD-10-CM

## 2021-11-02 DIAGNOSIS — E55.9 VITAMIN D DEFICIENCY: ICD-10-CM

## 2021-11-02 DIAGNOSIS — K42.9 UMBILICAL HERNIA WITHOUT OBSTRUCTION AND WITHOUT GANGRENE: ICD-10-CM

## 2021-11-02 DIAGNOSIS — I10 ESSENTIAL HYPERTENSION: ICD-10-CM

## 2021-11-02 DIAGNOSIS — R35.1 NOCTURIA: ICD-10-CM

## 2021-11-02 DIAGNOSIS — Z12.11 COLON CANCER SCREENING: ICD-10-CM

## 2021-11-02 DIAGNOSIS — E66.01 CLASS 2 SEVERE OBESITY DUE TO EXCESS CALORIES WITH SERIOUS COMORBIDITY AND BODY MASS INDEX (BMI) OF 38.0 TO 38.9 IN ADULT (HCC): ICD-10-CM

## 2021-11-02 DIAGNOSIS — I25.10 CORONARY ARTERY DISEASE INVOLVING NATIVE HEART WITHOUT ANGINA PECTORIS, UNSPECIFIED VESSEL OR LESION TYPE: ICD-10-CM

## 2021-11-02 DIAGNOSIS — Z00.00 MEDICARE ANNUAL WELLNESS VISIT, INITIAL: Primary | ICD-10-CM

## 2021-11-02 DIAGNOSIS — K21.9 GASTROESOPHAGEAL REFLUX DISEASE WITHOUT ESOPHAGITIS: ICD-10-CM

## 2021-11-02 PROCEDURE — G0439 PPPS, SUBSEQ VISIT: HCPCS | Performed by: FAMILY MEDICINE

## 2021-11-02 PROCEDURE — 99397 PER PM REEVAL EST PAT 65+ YR: CPT | Performed by: FAMILY MEDICINE

## 2021-11-02 PROCEDURE — 3078F DIAST BP <80 MM HG: CPT | Performed by: FAMILY MEDICINE

## 2021-11-02 PROCEDURE — 82306 VITAMIN D 25 HYDROXY: CPT

## 2021-11-02 PROCEDURE — 90732 PPSV23 VACC 2 YRS+ SUBQ/IM: CPT | Performed by: FAMILY MEDICINE

## 2021-11-02 PROCEDURE — 3075F SYST BP GE 130 - 139MM HG: CPT | Performed by: FAMILY MEDICINE

## 2021-11-02 PROCEDURE — 84443 ASSAY THYROID STIM HORMONE: CPT

## 2021-11-02 PROCEDURE — 3008F BODY MASS INDEX DOCD: CPT | Performed by: FAMILY MEDICINE

## 2021-11-02 PROCEDURE — G0009 ADMIN PNEUMOCOCCAL VACCINE: HCPCS | Performed by: FAMILY MEDICINE

## 2021-11-02 PROCEDURE — 96160 PT-FOCUSED HLTH RISK ASSMT: CPT | Performed by: FAMILY MEDICINE

## 2021-11-02 PROCEDURE — 36415 COLL VENOUS BLD VENIPUNCTURE: CPT

## 2021-11-02 PROCEDURE — 84153 ASSAY OF PSA TOTAL: CPT | Performed by: FAMILY MEDICINE

## 2021-11-02 PROCEDURE — 81003 URINALYSIS AUTO W/O SCOPE: CPT

## 2021-11-02 RX ORDER — OMEPRAZOLE 40 MG/1
40 CAPSULE, DELAYED RELEASE ORAL DAILY
Qty: 30 CAPSULE | Refills: 3 | Status: SHIPPED | OUTPATIENT
Start: 2021-11-02 | End: 2022-10-28

## 2021-11-02 NOTE — PROGRESS NOTES
HPI:   Barbara Vera is a 78year old male who presents for a Medicare Subsequent Annual Wellness visit (Pt already had Initial Annual Wellness). Barbara Vera is a 78year old male is here for routine periodic health screening and examination.   His las to patient in AVS       He smoked tobacco in the past but quit greater than 12 months ago.   Social History    Tobacco Use      Smoking status: Former Smoker      Smokeless tobacco: Never Used         CAGE screening score of 0 on 11/2/2021, showing low risk daily. Accu-Chek Softclix Lancets Does not apply Misc, 1 lancet by Finger stick route 2 (two) times daily. Glucose Blood (ACCU-CHEK ANDREW PLUS) In Vitro Strip, 1 lancet by Finger stick route 2 (two) times daily.   amLODIPine Besylate 10 MG TABS 10 mg, lis have to worry about missing the telephone ring or doorbell: No I have trouble hearing conversations in a noisy background such as a crowded room or restaurant: No   I get confused about where sounds come from: No I misunderstand some words in a sentence an hepatomegaly, splenomegaly or mass. Tenderness: There is no abdominal tenderness. Hernia: A hernia is present. Hernia is present in the umbilical area. Musculoskeletal:      Cervical back: Neck supple.       Comments: Spinal exam without scolios a day; dental care ), and Healthy habits& Social competence & Responsibilities: Recommendations on physical activity; exercise daily or at least 3 times a week for 30-60 minutes doing cardiovascular exercise.  Encourage to maintain the best physical and den the plan. Continue with current treatment plan. Reinforced healthy diet, lifestyle, and exercise. No follow-ups on file.      Krissy Norman MD, 11/2/2021     General Health     In the past six months, have you lost more than 10 pounds without tryin need ONE of the following:    Colonoscopy   Covered every 10 years    Covered every 2 years if patient is at high risk or previous colonoscopy was abnormal -    Colonoscopy Never done    Flexible Sigmoidoscopy   Covered every 4 years -    Fecal Occult Bloo BUN Annually Lab Results   Component Value Date    BUN 29 (H) 05/11/2021       Drug Serum Conc Annually No results found for: DIGOXIN, DIG, VALP

## 2021-11-03 ENCOUNTER — TELEPHONE (OUTPATIENT)
Dept: FAMILY MEDICINE CLINIC | Facility: CLINIC | Age: 66
End: 2021-11-03

## 2021-11-03 RX ORDER — ERGOCALCIFEROL 1.25 MG/1
50000 CAPSULE ORAL WEEKLY
Qty: 12 CAPSULE | Refills: 4 | Status: SHIPPED | OUTPATIENT
Start: 2021-11-03 | End: 2021-12-03

## 2021-11-03 NOTE — PROGRESS NOTES
Please notify patient that his/her blood test showed low levels of vitamin D. A prescription was sent to his pharmacy to take one capsule or tablet, once a week. This Rx is good for one year. We'll recheck levels in one year. Normal TSH.  UA only high suga

## 2021-12-20 ENCOUNTER — OFFICE VISIT (OUTPATIENT)
Dept: FAMILY MEDICINE CLINIC | Facility: CLINIC | Age: 66
End: 2021-12-20

## 2021-12-20 VITALS
SYSTOLIC BLOOD PRESSURE: 151 MMHG | DIASTOLIC BLOOD PRESSURE: 77 MMHG | BODY MASS INDEX: 38.97 KG/M2 | HEIGHT: 73 IN | HEART RATE: 60 BPM | WEIGHT: 294 LBS

## 2021-12-20 DIAGNOSIS — Z02.89 ENCOUNTER FOR EXAMINATION REQUIRED BY DEPARTMENT OF TRANSPORTATION (DOT): Primary | ICD-10-CM

## 2021-12-20 PROCEDURE — 3008F BODY MASS INDEX DOCD: CPT | Performed by: PHYSICIAN ASSISTANT

## 2021-12-20 PROCEDURE — 81002 URINALYSIS NONAUTO W/O SCOPE: CPT | Performed by: PHYSICIAN ASSISTANT

## 2021-12-20 PROCEDURE — 3078F DIAST BP <80 MM HG: CPT | Performed by: PHYSICIAN ASSISTANT

## 2021-12-20 PROCEDURE — 3077F SYST BP >= 140 MM HG: CPT | Performed by: PHYSICIAN ASSISTANT

## 2021-12-20 NOTE — PROGRESS NOTES
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 Patient is doing fine at this time. 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   Patient denies of chest pain, SOB, N/V/C/D, fever, dizziness, syncope, ab Vaccine(1) Never done  Diabetes Care A1C due on 04/19/2022  LDL Control due on 05/11/2022  Fall Risk Screening due on 11/02/2022  Annual Depression Screen due on 11/02/2022  Annual Wellness Visit due on 11/02/2022  PSA due on 11/02/2023  Pneumococcal Vacci wheezing. Cardiovascular: Negative. Negative for chest pain and palpitations. Gastrointestinal: Negative. Negative for abdominal distention, abdominal pain, blood in stool, constipation, diarrhea, nausea and vomiting. Genitourinary: Negative.     Smith Spicer motion and neck supple. Skin:     Findings: No rash. Neurological:      Mental Status: He is alert and oriented to person, place, and time.    Psychiatric:         Mood and Affect: Mood normal.         Behavior: Behavior normal.         Thought Content:

## 2022-04-07 NOTE — TELEPHONE ENCOUNTER
Please review refill failed/no protocol     Requested Prescriptions     Pending Prescriptions Disp Refills    METHOCARBAMOL 750 MG Oral Tab [Pharmacy Med Name: METHOCARBAMOL 750MG TABLETS] 90 tablet 1     Sig: TAKE 1 TABLET(750 MG) BY MOUTH THREE TIMES DAILY         Recent Visits  Date Type Provider Dept   11/02/21 Office Visit Gwendolyn Mckoy MD [facility]   10/19/21 Office Visit Gwendolyn Mckoy MD [facility]   05/11/21 Office Visit Gwendolyn Mckoy MD [facility]   02/05/21 Office Visit Gwendolyn Mckoy MD [facility]   12/17/20 Office Visit Gwendolyn Mckoy MD [facility]   Showing recent visits within past 540 days with a meds authorizing provider and meeting all other requirements  Future Appointments  No visits were found meeting these conditions.   Showing future appointments within next 150 days with a meds authorizing provider and meeting all other requirements    Requested Prescriptions   Pending Prescriptions Disp Refills    METHOCARBAMOL 750 MG Oral Tab [Pharmacy Med Name: METHOCARBAMOL 750MG TABLETS] 90 tablet 1     Sig: TAKE 1 TABLET(750 MG) BY MOUTH THREE TIMES DAILY        There is no refill protocol information for this order           Recent Outpatient Visits              3 months ago Encounter for examination required by Department of Transportation (DOT)    [address] [place] Emilia Crigler, PA-C    Office Visit    5 months ago Anita Mancuso annual wellness visit, initial    [place] [facility] [place], Joyce Miller MD    Office Visit    5 months ago Type 2 diabetes mellitus without complication, without long-term current use of insulin [facility])    [place] [facility], Nikita Mckoy MD    Office Visit    11 months ago Type 2 diabetes mellitus without complication, without long-term current use of insulin [facility])    [place] [facility] [place]ac[facility], Joyce Miller MD    Office Visit    1 year ago Obstructive sleep apnea syndrome    [address] [facility]    Office Visit

## 2022-04-23 RX ORDER — METHOCARBAMOL 750 MG/1
750 TABLET, FILM COATED ORAL 3 TIMES DAILY
Qty: 90 TABLET | Refills: 5 | OUTPATIENT
Start: 2022-04-23

## 2022-09-06 ENCOUNTER — OFFICE VISIT (OUTPATIENT)
Dept: FAMILY MEDICINE CLINIC | Facility: CLINIC | Age: 67
End: 2022-09-06
Payer: MEDICARE

## 2022-09-06 ENCOUNTER — LAB ENCOUNTER (OUTPATIENT)
Dept: LAB | Age: 67
End: 2022-09-06
Attending: FAMILY MEDICINE
Payer: MEDICARE

## 2022-09-06 VITALS
DIASTOLIC BLOOD PRESSURE: 75 MMHG | WEIGHT: 289.19 LBS | HEIGHT: 73 IN | SYSTOLIC BLOOD PRESSURE: 119 MMHG | HEART RATE: 64 BPM | BODY MASS INDEX: 38.33 KG/M2

## 2022-09-06 DIAGNOSIS — E55.9 VITAMIN D DEFICIENCY: ICD-10-CM

## 2022-09-06 DIAGNOSIS — Z12.11 COLON CANCER SCREENING: ICD-10-CM

## 2022-09-06 DIAGNOSIS — I10 ESSENTIAL HYPERTENSION: ICD-10-CM

## 2022-09-06 DIAGNOSIS — K21.9 GASTROESOPHAGEAL REFLUX DISEASE WITHOUT ESOPHAGITIS: ICD-10-CM

## 2022-09-06 DIAGNOSIS — I25.10 CORONARY ARTERY DISEASE INVOLVING NATIVE HEART WITHOUT ANGINA PECTORIS, UNSPECIFIED VESSEL OR LESION TYPE: ICD-10-CM

## 2022-09-06 DIAGNOSIS — G47.33 OSA (OBSTRUCTIVE SLEEP APNEA): ICD-10-CM

## 2022-09-06 DIAGNOSIS — E11.9 TYPE 2 DIABETES MELLITUS WITHOUT COMPLICATION, WITHOUT LONG-TERM CURRENT USE OF INSULIN (HCC): ICD-10-CM

## 2022-09-06 DIAGNOSIS — R35.1 NOCTURIA: ICD-10-CM

## 2022-09-06 DIAGNOSIS — Z00.00 MEDICARE ANNUAL WELLNESS VISIT, INITIAL: Primary | ICD-10-CM

## 2022-09-06 DIAGNOSIS — G56.02 CARPAL TUNNEL SYNDROME OF LEFT WRIST: ICD-10-CM

## 2022-09-06 DIAGNOSIS — Z91.81 RISK FOR FALLS: ICD-10-CM

## 2022-09-06 DIAGNOSIS — E66.01 CLASS 2 SEVERE OBESITY DUE TO EXCESS CALORIES WITH SERIOUS COMORBIDITY AND BODY MASS INDEX (BMI) OF 38.0 TO 38.9 IN ADULT (HCC): ICD-10-CM

## 2022-09-06 LAB
ALBUMIN SERPL-MCNC: 4.2 G/DL (ref 3.4–5)
ALBUMIN/GLOB SERPL: 1 {RATIO} (ref 1–2)
ALP LIVER SERPL-CCNC: 95 U/L
ALT SERPL-CCNC: 42 U/L
ANION GAP SERPL CALC-SCNC: 9 MMOL/L (ref 0–18)
AST SERPL-CCNC: 24 U/L (ref 15–37)
BASOPHILS # BLD AUTO: 0.06 X10(3) UL (ref 0–0.2)
BASOPHILS NFR BLD AUTO: 0.7 %
BILIRUB SERPL-MCNC: 0.3 MG/DL (ref 0.1–2)
BILIRUB UR QL: NEGATIVE
BUN BLD-MCNC: 26 MG/DL (ref 7–18)
BUN/CREAT SERPL: 17.6 (ref 10–20)
CALCIUM BLD-MCNC: 9.3 MG/DL (ref 8.5–10.1)
CHLORIDE SERPL-SCNC: 106 MMOL/L (ref 98–112)
CHOLEST SERPL-MCNC: 160 MG/DL (ref ?–200)
CLARITY UR: CLEAR
CO2 SERPL-SCNC: 24 MMOL/L (ref 21–32)
COLOR UR: YELLOW
CREAT BLD-MCNC: 1.48 MG/DL
DEPRECATED RDW RBC AUTO: 47.3 FL (ref 35.1–46.3)
EOSINOPHIL # BLD AUTO: 0.22 X10(3) UL (ref 0–0.7)
EOSINOPHIL NFR BLD AUTO: 2.4 %
ERYTHROCYTE [DISTWIDTH] IN BLOOD BY AUTOMATED COUNT: 14.1 % (ref 11–15)
EST. AVERAGE GLUCOSE BLD GHB EST-MCNC: 151 MG/DL (ref 68–126)
FASTING PATIENT LIPID ANSWER: YES
FASTING STATUS PATIENT QL REPORTED: YES
GFR SERPLBLD BASED ON 1.73 SQ M-ARVRAT: 52 ML/MIN/1.73M2 (ref 60–?)
GLOBULIN PLAS-MCNC: 4.3 G/DL (ref 2.8–4.4)
GLUCOSE BLD-MCNC: 113 MG/DL (ref 70–99)
GLUCOSE UR-MCNC: 500 MG/DL
HBA1C MFR BLD: 6.9 % (ref ?–5.7)
HCT VFR BLD AUTO: 47.9 %
HDLC SERPL-MCNC: 44 MG/DL (ref 40–59)
HGB BLD-MCNC: 15.3 G/DL
HGB UR QL STRIP.AUTO: NEGATIVE
IMM GRANULOCYTES # BLD AUTO: 0.02 X10(3) UL (ref 0–1)
IMM GRANULOCYTES NFR BLD: 0.2 %
KETONES UR-MCNC: NEGATIVE MG/DL
LDLC SERPL CALC-MCNC: 89 MG/DL (ref ?–100)
LEUKOCYTE ESTERASE UR QL STRIP.AUTO: NEGATIVE
LYMPHOCYTES # BLD AUTO: 2.37 X10(3) UL (ref 1–4)
LYMPHOCYTES NFR BLD AUTO: 25.7 %
MCH RBC QN AUTO: 28.9 PG (ref 26–34)
MCHC RBC AUTO-ENTMCNC: 31.9 G/DL (ref 31–37)
MCV RBC AUTO: 90.4 FL
MONOCYTES # BLD AUTO: 0.71 X10(3) UL (ref 0.1–1)
MONOCYTES NFR BLD AUTO: 7.7 %
NEUTROPHILS # BLD AUTO: 5.85 X10 (3) UL (ref 1.5–7.7)
NEUTROPHILS # BLD AUTO: 5.85 X10(3) UL (ref 1.5–7.7)
NEUTROPHILS NFR BLD AUTO: 63.3 %
NITRITE UR QL STRIP.AUTO: NEGATIVE
NONHDLC SERPL-MCNC: 116 MG/DL (ref ?–130)
OSMOLALITY SERPL CALC.SUM OF ELEC: 294 MOSM/KG (ref 275–295)
PH UR: 5.5 [PH] (ref 5–8)
PLATELET # BLD AUTO: 311 10(3)UL (ref 150–450)
POTASSIUM SERPL-SCNC: 4.6 MMOL/L (ref 3.5–5.1)
PROT SERPL-MCNC: 8.5 G/DL (ref 6.4–8.2)
PROT UR-MCNC: NEGATIVE MG/DL
PSA SERPL-MCNC: 0.23 NG/ML (ref ?–4)
RBC # BLD AUTO: 5.3 X10(6)UL
SODIUM SERPL-SCNC: 139 MMOL/L (ref 136–145)
SP GR UR STRIP: 1.01 (ref 1–1.03)
TRIGL SERPL-MCNC: 154 MG/DL (ref 30–149)
TSI SER-ACNC: 2.24 MIU/ML (ref 0.36–3.74)
UROBILINOGEN UR STRIP-ACNC: 0.2
VIT D+METAB SERPL-MCNC: 47.8 NG/ML (ref 30–100)
VLDLC SERPL CALC-MCNC: 25 MG/DL (ref 0–30)
WBC # BLD AUTO: 9.2 X10(3) UL (ref 4–11)

## 2022-09-06 PROCEDURE — 90677 PCV20 VACCINE IM: CPT | Performed by: FAMILY MEDICINE

## 2022-09-06 PROCEDURE — 80053 COMPREHEN METABOLIC PANEL: CPT

## 2022-09-06 PROCEDURE — 1125F AMNT PAIN NOTED PAIN PRSNT: CPT | Performed by: FAMILY MEDICINE

## 2022-09-06 PROCEDURE — 84443 ASSAY THYROID STIM HORMONE: CPT

## 2022-09-06 PROCEDURE — 3074F SYST BP LT 130 MM HG: CPT | Performed by: FAMILY MEDICINE

## 2022-09-06 PROCEDURE — 3078F DIAST BP <80 MM HG: CPT | Performed by: FAMILY MEDICINE

## 2022-09-06 PROCEDURE — 83036 HEMOGLOBIN GLYCOSYLATED A1C: CPT

## 2022-09-06 PROCEDURE — 81003 URINALYSIS AUTO W/O SCOPE: CPT

## 2022-09-06 PROCEDURE — 93010 ELECTROCARDIOGRAM REPORT: CPT | Performed by: FAMILY MEDICINE

## 2022-09-06 PROCEDURE — 99397 PER PM REEVAL EST PAT 65+ YR: CPT | Performed by: FAMILY MEDICINE

## 2022-09-06 PROCEDURE — 82306 VITAMIN D 25 HYDROXY: CPT

## 2022-09-06 PROCEDURE — G0439 PPPS, SUBSEQ VISIT: HCPCS | Performed by: FAMILY MEDICINE

## 2022-09-06 PROCEDURE — 96160 PT-FOCUSED HLTH RISK ASSMT: CPT | Performed by: FAMILY MEDICINE

## 2022-09-06 PROCEDURE — 36415 COLL VENOUS BLD VENIPUNCTURE: CPT

## 2022-09-06 PROCEDURE — 80061 LIPID PANEL: CPT

## 2022-09-06 PROCEDURE — 93005 ELECTROCARDIOGRAM TRACING: CPT

## 2022-09-06 PROCEDURE — 84153 ASSAY OF PSA TOTAL: CPT

## 2022-09-06 PROCEDURE — 3008F BODY MASS INDEX DOCD: CPT | Performed by: FAMILY MEDICINE

## 2022-09-06 PROCEDURE — G0009 ADMIN PNEUMOCOCCAL VACCINE: HCPCS | Performed by: FAMILY MEDICINE

## 2022-09-06 PROCEDURE — 85025 COMPLETE CBC W/AUTO DIFF WBC: CPT

## 2022-09-06 RX ORDER — ERGOCALCIFEROL 1.25 MG/1
50000 CAPSULE ORAL WEEKLY
COMMUNITY
Start: 2022-08-22

## 2022-09-06 RX ORDER — TORSEMIDE 20 MG/1
20 TABLET ORAL DAILY
Qty: 90 TABLET | Refills: 3 | Status: SHIPPED | OUTPATIENT
Start: 2022-09-06

## 2022-09-06 RX ORDER — OMEPRAZOLE 40 MG/1
40 CAPSULE, DELAYED RELEASE ORAL DAILY
Qty: 30 CAPSULE | Refills: 3 | Status: SHIPPED | OUTPATIENT
Start: 2022-09-06 | End: 2023-09-01

## 2022-09-06 RX ORDER — LISINOPRIL 20 MG/1
20 TABLET ORAL DAILY
Qty: 90 TABLET | Refills: 2 | Status: SHIPPED | OUTPATIENT
Start: 2022-09-06

## 2022-09-06 RX ORDER — NAPROXEN 500 MG/1
500 TABLET ORAL 2 TIMES DAILY WITH MEALS
Qty: 60 TABLET | Refills: 1 | Status: SHIPPED | OUTPATIENT
Start: 2022-09-06

## 2022-09-06 RX ORDER — SIMVASTATIN 20 MG
20 TABLET ORAL NIGHTLY
Qty: 90 TABLET | Refills: 3 | Status: SHIPPED | OUTPATIENT
Start: 2022-09-06

## 2022-09-06 RX ORDER — CLOPIDOGREL BISULFATE 75 MG/1
75 TABLET ORAL DAILY
Qty: 90 TABLET | Refills: 2 | Status: SHIPPED | OUTPATIENT
Start: 2022-09-06

## 2022-09-06 RX ORDER — AMLODIPINE BESYLATE 10 MG/1
10 TABLET ORAL DAILY
Qty: 90 TABLET | Refills: 2 | Status: SHIPPED | OUTPATIENT
Start: 2022-09-06

## 2022-09-06 RX ORDER — NEBIVOLOL 10 MG/1
10 TABLET ORAL DAILY
Qty: 90 TABLET | Refills: 2 | Status: SHIPPED | OUTPATIENT
Start: 2022-09-06

## 2022-09-06 RX ORDER — EMPAGLIFLOZIN AND METFORMIN HYDROCHLORIDE 12.5; 1 MG/1; MG/1
1 TABLET ORAL 2 TIMES DAILY
Qty: 180 TABLET | Refills: 3 | Status: SHIPPED | OUTPATIENT
Start: 2022-09-06

## 2022-11-16 ENCOUNTER — MED REC SCAN ONLY (OUTPATIENT)
Dept: FAMILY MEDICINE CLINIC | Facility: CLINIC | Age: 67
End: 2022-11-16

## 2022-12-06 RX ORDER — NAPROXEN 500 MG/1
500 TABLET ORAL 2 TIMES DAILY WITH MEALS
Qty: 60 TABLET | Refills: 1 | Status: SHIPPED | OUTPATIENT
Start: 2022-12-06

## 2022-12-06 NOTE — TELEPHONE ENCOUNTER
Refill passed per [employer] [facility] protocol.      Requested Prescriptions   Pending Prescriptions Disp Refills    NAPROXEN 500 MG Oral Tab [Pharmacy Med Name: NAPROXEN 500MG TABLETS] 60 tablet 1     Sig: TAKE 1 TABLET(500 MG) BY MOUTH TWICE DAILY WITH MEALS       Non-Narcotic Pain Medication Protocol Passed - 12/6/2022 10:53 AM        Passed - In person appointment or virtual visit in the past 6 mos or appointment in next 3 mos     Recent Outpatient Visits              3 months ago Medicare annual wellness visit, initial    [place] [employer] [facility], [address], Roselyn Habermann, MD    Office Visit    11 months ago Encounter for examination required by Department of Transportation (DOT)    [address] [facility] Eve Rodriguez PA-C    Office Visit    1 year ago Medicare annual wellness visit, initial    [facility], [place]acie Peters, Nikita Montalvo MD    Office Visit    1 year ago Type 2 diabetes mellitus without complication, without long-term current use of insulin [facility])    [place] [employer] [facility], [place]acie Peters, Nikita Montalvo MD    Office Visit    1 year ago Type 2 diabetes mellitus without complication, without long-term current use of insulin [facility])    [place] [employer] [facility], Stacie Peters, Nikita Montalvo MD    Office Visit                            Recent Outpatient Visits              3 months ago Medicare annual wellness visit, initial    [address], Roselyn Habermann, MD    Office Visit    11 months ago Encounter for examination required by Department of Transportation (DOT)    [address] [facility] Eve Rodriguez PA-C    Office Visit    1 year ago Medicare annual wellness visit, initial    [employer] [facility], [address], Roselyn Habermann, MD    Office Visit    1 year ago Type 2 diabetes mellitus without complication, without long-term current use of insulin [facility])    [place] [employer] [facility], Stacie Peters, Nikita Bell Tommy Arzola MD    Office Visit    1 year ago Type 2 diabetes mellitus without complication, without long-term current use of insulin [facility])    [facility], [facility], [address], [place] Sharri Yanez MD    Office Visit

## 2022-12-08 RX ORDER — OMEPRAZOLE 40 MG/1
40 CAPSULE, DELAYED RELEASE ORAL DAILY
Qty: 90 CAPSULE | Refills: 1 | Status: SHIPPED | OUTPATIENT
Start: 2022-12-08

## 2022-12-08 NOTE — TELEPHONE ENCOUNTER
Refill passed per [facility] [facility] protocol.     Med passed protocol, has high interaction, cannot proceed from here    Requested Prescriptions   Pending Prescriptions Disp Refills    OMEPRAZOLE 40 MG Oral Capsule Delayed Release [Pharmacy Med Name: OMEPRAZOLE 40MG CAPSULES] 30 capsule 3     Sig: TAKE 1 CAPSULE(40 MG) BY MOUTH DAILY       Gastrointestional Medication Protocol Passed - 12/8/2022  6:09 AM        Passed - In person appointment or virtual visit in the past 12 mos or appointment in next 3 mos     Recent Outpatient Visits              3 months ago Medicare annual wellness visit, initial    [employer] [facility], Tomas Virk MD    Office Visit    11 months ago Encounter for examination required by Department of Transportation (DOT)    [address] Edi Ken PA-C    Office Visit    1 year ago Medicare annual wellness visit, initial    [facility], [place]acie Peters, Tomas Iraheta MD    Office Visit    1 year ago Type 2 diabetes mellitus without complication, without long-term current use of insulin [facility])    [place] [employer] [facility], [place]acie Peters, Nikita Hernández MD    Office Visit    1 year ago Type 2 diabetes mellitus without complication, without long-term current use of insulin [facility])    [place] [employer] [facility], Stacie Peters, Nikita Hernández MD    Office Visit                         Recent Outpatient Visits              3 months ago Medicare annual wellness visit, initial    [address], Tomas Iraheta MD    Office Visit    11 months ago Encounter for examination required by Department of Transportation (DOT)    [address] Edi Ken PA-C    Office Visit    1 year ago Medicare annual wellness visit, initial    [facility], Tomas Virk MD    Office Visit    1 year ago Type 2 diabetes mellitus without complication, without long-term current use of insulin [facility])    [address], Nikita Beavers MD    Office Visit    1 year ago Type 2 diabetes mellitus without complication, without long-term current use of insulin [facility])    [facility], [facility], [address], Nikita Beavers MD    Office Visit

## 2023-02-09 ENCOUNTER — TELEPHONE (OUTPATIENT)
Dept: FAMILY MEDICINE CLINIC | Facility: CLINIC | Age: 68
End: 2023-02-09

## 2023-05-26 ENCOUNTER — LAB ENCOUNTER (OUTPATIENT)
Dept: LAB | Facility: REFERENCE LAB | Age: 68
End: 2023-05-26
Attending: FAMILY MEDICINE
Payer: MEDICARE

## 2023-05-26 ENCOUNTER — OFFICE VISIT (OUTPATIENT)
Dept: FAMILY MEDICINE CLINIC | Facility: CLINIC | Age: 68
End: 2023-05-26

## 2023-05-26 VITALS
HEART RATE: 54 BPM | SYSTOLIC BLOOD PRESSURE: 133 MMHG | BODY MASS INDEX: 38.7 KG/M2 | WEIGHT: 292 LBS | DIASTOLIC BLOOD PRESSURE: 68 MMHG | HEIGHT: 73 IN

## 2023-05-26 DIAGNOSIS — Z91.81 RISK FOR FALLS: ICD-10-CM

## 2023-05-26 DIAGNOSIS — I25.10 CORONARY ARTERY DISEASE INVOLVING NATIVE HEART WITHOUT ANGINA PECTORIS, UNSPECIFIED VESSEL OR LESION TYPE: ICD-10-CM

## 2023-05-26 DIAGNOSIS — E66.01 CLASS 2 SEVERE OBESITY DUE TO EXCESS CALORIES WITH SERIOUS COMORBIDITY AND BODY MASS INDEX (BMI) OF 38.0 TO 38.9 IN ADULT (HCC): ICD-10-CM

## 2023-05-26 DIAGNOSIS — I10 ESSENTIAL HYPERTENSION: ICD-10-CM

## 2023-05-26 DIAGNOSIS — E11.9 TYPE 2 DIABETES MELLITUS WITHOUT COMPLICATION, WITHOUT LONG-TERM CURRENT USE OF INSULIN (HCC): ICD-10-CM

## 2023-05-26 DIAGNOSIS — E55.9 VITAMIN D DEFICIENCY: ICD-10-CM

## 2023-05-26 DIAGNOSIS — R35.1 NOCTURIA: ICD-10-CM

## 2023-05-26 DIAGNOSIS — G47.33 OSA (OBSTRUCTIVE SLEEP APNEA): ICD-10-CM

## 2023-05-26 DIAGNOSIS — K42.9 UMBILICAL HERNIA WITHOUT OBSTRUCTION AND WITHOUT GANGRENE: ICD-10-CM

## 2023-05-26 DIAGNOSIS — Z00.00 MEDICARE ANNUAL WELLNESS VISIT, INITIAL: Primary | ICD-10-CM

## 2023-05-26 DIAGNOSIS — Z12.11 COLON CANCER SCREENING: ICD-10-CM

## 2023-05-26 DIAGNOSIS — K21.9 GASTROESOPHAGEAL REFLUX DISEASE WITHOUT ESOPHAGITIS: ICD-10-CM

## 2023-05-26 DIAGNOSIS — G56.02 CARPAL TUNNEL SYNDROME OF LEFT WRIST: ICD-10-CM

## 2023-05-26 LAB
ALBUMIN SERPL-MCNC: 4.4 G/DL (ref 3.4–5)
ALBUMIN/GLOB SERPL: 1.1 {RATIO} (ref 1–2)
ALP LIVER SERPL-CCNC: 90 U/L
ALT SERPL-CCNC: 52 U/L
ANION GAP SERPL CALC-SCNC: 7 MMOL/L (ref 0–18)
AST SERPL-CCNC: 35 U/L (ref 15–37)
BASOPHILS # BLD AUTO: 0.08 X10(3) UL (ref 0–0.2)
BASOPHILS NFR BLD AUTO: 0.9 %
BILIRUB SERPL-MCNC: 0.4 MG/DL (ref 0.1–2)
BILIRUB UR QL: NEGATIVE
BUN BLD-MCNC: 21 MG/DL (ref 7–18)
BUN/CREAT SERPL: 20 (ref 10–20)
CALCIUM BLD-MCNC: 9.2 MG/DL (ref 8.5–10.1)
CHLORIDE SERPL-SCNC: 105 MMOL/L (ref 98–112)
CHOLEST SERPL-MCNC: 154 MG/DL (ref ?–200)
CLARITY UR: CLEAR
CO2 SERPL-SCNC: 25 MMOL/L (ref 21–32)
CREAT BLD-MCNC: 1.05 MG/DL
CREAT UR-SCNC: 153 MG/DL
DEPRECATED RDW RBC AUTO: 48.5 FL (ref 35.1–46.3)
EOSINOPHIL # BLD AUTO: 0.43 X10(3) UL (ref 0–0.7)
EOSINOPHIL NFR BLD AUTO: 4.8 %
ERYTHROCYTE [DISTWIDTH] IN BLOOD BY AUTOMATED COUNT: 14.7 % (ref 11–15)
EST. AVERAGE GLUCOSE BLD GHB EST-MCNC: 140 MG/DL (ref 68–126)
FASTING PATIENT LIPID ANSWER: YES
FASTING STATUS PATIENT QL REPORTED: YES
GFR SERPLBLD BASED ON 1.73 SQ M-ARVRAT: 77 ML/MIN/1.73M2 (ref 60–?)
GLOBULIN PLAS-MCNC: 3.9 G/DL (ref 2.8–4.4)
GLUCOSE BLD-MCNC: 113 MG/DL (ref 70–99)
GLUCOSE UR-MCNC: >1000 MG/DL
HBA1C MFR BLD: 6.5 % (ref ?–5.7)
HCT VFR BLD AUTO: 47 %
HDLC SERPL-MCNC: 45 MG/DL (ref 40–59)
HGB BLD-MCNC: 14.8 G/DL
HGB UR QL STRIP.AUTO: NEGATIVE
IMM GRANULOCYTES # BLD AUTO: 0.02 X10(3) UL (ref 0–1)
IMM GRANULOCYTES NFR BLD: 0.2 %
KETONES UR-MCNC: NEGATIVE MG/DL
LDLC SERPL CALC-MCNC: 84 MG/DL (ref ?–100)
LEUKOCYTE ESTERASE UR QL STRIP.AUTO: NEGATIVE
LYMPHOCYTES # BLD AUTO: 2.47 X10(3) UL (ref 1–4)
LYMPHOCYTES NFR BLD AUTO: 27.8 %
MCH RBC QN AUTO: 27.9 PG (ref 26–34)
MCHC RBC AUTO-ENTMCNC: 31.5 G/DL (ref 31–37)
MCV RBC AUTO: 88.5 FL
MICROALBUMIN UR-MCNC: 2.73 MG/DL
MICROALBUMIN/CREAT 24H UR-RTO: 17.8 UG/MG (ref ?–30)
MONOCYTES # BLD AUTO: 0.73 X10(3) UL (ref 0.1–1)
MONOCYTES NFR BLD AUTO: 8.2 %
NEUTROPHILS # BLD AUTO: 5.16 X10 (3) UL (ref 1.5–7.7)
NEUTROPHILS # BLD AUTO: 5.16 X10(3) UL (ref 1.5–7.7)
NEUTROPHILS NFR BLD AUTO: 58.1 %
NITRITE UR QL STRIP.AUTO: NEGATIVE
NONHDLC SERPL-MCNC: 109 MG/DL (ref ?–130)
OSMOLALITY SERPL CALC.SUM OF ELEC: 288 MOSM/KG (ref 275–295)
PH UR: 5.5 [PH] (ref 5–8)
PLATELET # BLD AUTO: 264 10(3)UL (ref 150–450)
POTASSIUM SERPL-SCNC: 4.3 MMOL/L (ref 3.5–5.1)
PROT SERPL-MCNC: 8.3 G/DL (ref 6.4–8.2)
PSA SERPL-MCNC: 0.21 NG/ML (ref ?–4)
RBC # BLD AUTO: 5.31 X10(6)UL
SODIUM SERPL-SCNC: 137 MMOL/L (ref 136–145)
SP GR UR STRIP: >1.03 (ref 1–1.03)
TRIGL SERPL-MCNC: 143 MG/DL (ref 30–149)
TSI SER-ACNC: 1.89 MIU/ML (ref 0.36–3.74)
UROBILINOGEN UR STRIP-ACNC: NORMAL
VIT D+METAB SERPL-MCNC: 35.9 NG/ML (ref 30–100)
VLDLC SERPL CALC-MCNC: 23 MG/DL (ref 0–30)
WBC # BLD AUTO: 8.9 X10(3) UL (ref 4–11)

## 2023-05-26 PROCEDURE — 83036 HEMOGLOBIN GLYCOSYLATED A1C: CPT

## 2023-05-26 PROCEDURE — 82570 ASSAY OF URINE CREATININE: CPT

## 2023-05-26 PROCEDURE — 84153 ASSAY OF PSA TOTAL: CPT

## 2023-05-26 PROCEDURE — 81001 URINALYSIS AUTO W/SCOPE: CPT

## 2023-05-26 PROCEDURE — 82306 VITAMIN D 25 HYDROXY: CPT

## 2023-05-26 PROCEDURE — 85025 COMPLETE CBC W/AUTO DIFF WBC: CPT

## 2023-05-26 PROCEDURE — 80061 LIPID PANEL: CPT

## 2023-05-26 PROCEDURE — 84443 ASSAY THYROID STIM HORMONE: CPT

## 2023-05-26 PROCEDURE — 82043 UR ALBUMIN QUANTITATIVE: CPT

## 2023-05-26 PROCEDURE — 36415 COLL VENOUS BLD VENIPUNCTURE: CPT

## 2023-05-26 PROCEDURE — 80053 COMPREHEN METABOLIC PANEL: CPT

## 2023-05-26 RX ORDER — SIMVASTATIN 20 MG
20 TABLET ORAL NIGHTLY
Qty: 90 TABLET | Refills: 3 | Status: SHIPPED | OUTPATIENT
Start: 2023-05-26

## 2023-05-26 RX ORDER — CLOPIDOGREL BISULFATE 75 MG/1
75 TABLET ORAL DAILY
Qty: 90 TABLET | Refills: 2 | Status: SHIPPED | OUTPATIENT
Start: 2023-05-26

## 2023-05-26 RX ORDER — NEBIVOLOL 10 MG/1
10 TABLET ORAL DAILY
Qty: 90 TABLET | Refills: 2 | Status: SHIPPED | OUTPATIENT
Start: 2023-05-26

## 2023-05-26 RX ORDER — AMLODIPINE BESYLATE 10 MG/1
10 TABLET ORAL DAILY
Qty: 90 TABLET | Refills: 2 | Status: SHIPPED | OUTPATIENT
Start: 2023-05-26

## 2023-05-26 RX ORDER — OMEPRAZOLE 40 MG/1
40 CAPSULE, DELAYED RELEASE ORAL DAILY
Qty: 90 CAPSULE | Refills: 1 | Status: SHIPPED | OUTPATIENT
Start: 2023-05-26

## 2023-05-26 RX ORDER — METHOCARBAMOL 750 MG/1
750 TABLET, FILM COATED ORAL 3 TIMES DAILY
Qty: 90 TABLET | Refills: 1 | Status: SHIPPED | OUTPATIENT
Start: 2023-05-26

## 2023-05-26 RX ORDER — LISINOPRIL 20 MG/1
20 TABLET ORAL DAILY
Qty: 90 TABLET | Refills: 2 | Status: SHIPPED | OUTPATIENT
Start: 2023-05-26

## 2023-05-26 RX ORDER — EMPAGLIFLOZIN AND METFORMIN HYDROCHLORIDE 12.5; 1 MG/1; MG/1
1 TABLET ORAL 2 TIMES DAILY
Qty: 60 TABLET | Refills: 0 | Status: SHIPPED | OUTPATIENT
Start: 2023-05-26

## 2023-05-26 RX ORDER — TORSEMIDE 20 MG/1
20 TABLET ORAL DAILY
Qty: 90 TABLET | Refills: 3 | Status: SHIPPED | OUTPATIENT
Start: 2023-05-26

## 2023-05-31 ENCOUNTER — TELEPHONE (OUTPATIENT)
Dept: FAMILY MEDICINE CLINIC | Facility: CLINIC | Age: 68
End: 2023-05-31

## 2023-05-31 DIAGNOSIS — I25.10 CORONARY ARTERY DISEASE INVOLVING NATIVE HEART WITHOUT ANGINA PECTORIS, UNSPECIFIED VESSEL OR LESION TYPE: ICD-10-CM

## 2023-06-01 RX ORDER — CLOPIDOGREL BISULFATE 75 MG/1
TABLET ORAL
Qty: 90 TABLET | Refills: 2 | OUTPATIENT
Start: 2023-06-01

## 2023-06-01 RX ORDER — LISINOPRIL 20 MG/1
TABLET ORAL
Qty: 90 TABLET | Refills: 2 | OUTPATIENT
Start: 2023-06-01

## 2023-06-01 RX ORDER — OMEPRAZOLE 40 MG/1
CAPSULE, DELAYED RELEASE ORAL
Qty: 90 CAPSULE | Refills: 1 | OUTPATIENT
Start: 2023-06-01

## 2023-06-02 LAB — HEMOCCULT STL QL: NEGATIVE

## 2023-06-02 PROCEDURE — 82274 ASSAY TEST FOR BLOOD FECAL: CPT | Performed by: FAMILY MEDICINE

## 2023-11-13 ENCOUNTER — TELEPHONE (OUTPATIENT)
Dept: INTERNAL MEDICINE CLINIC | Facility: CLINIC | Age: 68
End: 2023-11-13

## 2023-11-13 DIAGNOSIS — I25.10 CORONARY ARTERY DISEASE INVOLVING NATIVE CORONARY ARTERY OF NATIVE HEART WITHOUT ANGINA PECTORIS: Primary | ICD-10-CM

## 2023-11-13 NOTE — TELEPHONE ENCOUNTER
Patient requesting referral to [facility] Cardiology Dr. Juanito Barajas for follow up

## 2023-11-27 RX ORDER — OMEPRAZOLE 40 MG/1
40 CAPSULE, DELAYED RELEASE ORAL DAILY
Qty: 90 CAPSULE | Refills: 3 | Status: SHIPPED | OUTPATIENT
Start: 2023-11-27

## 2024-01-15 RX ORDER — EMPAGLIFLOZIN AND METFORMIN HYDROCHLORIDE 12.5; 1 MG/1; MG/1
1 TABLET ORAL 2 TIMES DAILY
Qty: 180 TABLET | Refills: 1 | Status: SHIPPED | OUTPATIENT
Start: 2024-01-15

## 2024-01-15 NOTE — TELEPHONE ENCOUNTER
Pt would like a refill on his synjardy medication. Per the patient he is out of medication. Pharmacy: [facility]/[place], [place] (listed)     Current Outpatient Medications   Medication Sig Dispense Refill    SYNJARDY 12.5-1000 MG Oral Tab Take 1 tablet by mouth 2 (two) times daily. 60 tablet 0

## 2024-01-15 NOTE — TELEPHONE ENCOUNTER
Please review; protocol failed/No Protocol  Requested Prescriptions   Pending Prescriptions Disp Refills    SYNJARDY 12.5-1000 MG Oral Tab 60 tablet 0     Sig: Take 1 tablet by mouth 2 (two) times daily.       Diabetes Medication Protocol Failed - 1/15/2024 10:26 AM        Failed - Last A1C < 7.5 and within past 6 months     Lab Results   Component Value Date    A1C 6.5 (H) 05/26/2023             Passed - In person appointment or virtual visit in the past 6 mos or appointment in next 3 mos     Recent Outpatient Visits              7 months ago Medicare annual wellness visit, initial    [facility], Rihcard Araujo MD    Office Visit    1 year ago Medicare annual wellness visit, initial    [facility], Richard Araujo MD    Office Visit    2 years ago Encounter for examination required by Department of Transportation (DOT)    [facility], [place], Minhxuyen, PA-C    Office Visit    2 years ago Medicare annual wellness visit, initial    [facility], Richard Araujo MD    Office Visit    2 years ago Type 2 diabetes mellitus without complication, without long-term current use of insulin ([facility])    [facility], Richard Araujo MD    Office Visit          Future Appointments         Provider Department Appt Notes    In 1 week Richadr Key MD [facility], [address], [place] medicare physical/last physical 5-26-23 policy informed to pt               Passed - EGFRCR or GFRNAA > 50     GFR Evaluation  EGFRCR: 77 , resulted on 5/26/2023          Passed - GFR in the past 12 months           Future Appointments         Provider Department Appt Notes    In 1 week Richard Key MD [facility], [address], [place] medicare physical/last physical 5-26-23 policy informed to pt           Recent Outpatient Visits              7 months ago Medicare annual wellness visit, initial    [facility] [place] [facility]    Office Visit    1 year ago Medicare annual wellness visit, initial    [facility] [place] [facility] Ricardo, [facility]    Office Visit    2 years ago Encounter for examination required by Department of Transportation (DOT)    [facility], [place], Minhxuyen, PA-C    Office Visit    2 years ago Medicare annual wellness visit, initial    [facility] [place] [facility] Ricardo[facility]    Office Visit    2 years ago Type 2 diabetes mellitus without complication, without long-term current use of insulin (HCC)    [facility] [place] [facility] Ricardo, [facility]    Office Visit

## 2024-02-25 DIAGNOSIS — I25.10 CORONARY ARTERY DISEASE INVOLVING NATIVE HEART WITHOUT ANGINA PECTORIS, UNSPECIFIED VESSEL OR LESION TYPE: ICD-10-CM

## 2024-02-26 RX ORDER — LISINOPRIL 20 MG/1
20 TABLET ORAL DAILY
Qty: 90 TABLET | Refills: 0 | Status: SHIPPED | OUTPATIENT
Start: 2024-02-26

## 2024-02-26 RX ORDER — CLOPIDOGREL BISULFATE 75 MG/1
75 TABLET ORAL DAILY
Qty: 90 TABLET | Refills: 0 | Status: SHIPPED | OUTPATIENT
Start: 2024-02-26

## 2024-02-26 RX ORDER — AMLODIPINE BESYLATE 10 MG/1
10 TABLET ORAL DAILY
Qty: 90 TABLET | Refills: 0 | Status: SHIPPED | OUTPATIENT
Start: 2024-02-26

## 2024-02-26 NOTE — TELEPHONE ENCOUNTER
Refill Passed Per Protocol    Requested Prescriptions   Pending Prescriptions Disp Refills    LISINOPRIL 20 MG Oral Tab [Pharmacy Med Name: LISINOPRIL 20MG TABLETS] 90 tablet 2     Sig: TAKE 1 TABLET(20 MG) BY MOUTH DAILY       Hypertension Medications Protocol Passed - 2/25/2024  6:08 AM        Passed - CMP or BMP in past 12 months        Passed - Last BP reading less than 140/90     BP Readings from Last 1 Encounters:   05/26/23 133/68               Passed - In person appointment or virtual visit in the past 12 mos or appointment in next 3 mos     Recent Outpatient Visits              9 months ago Medicare annual wellness visit, initial    [facility] [address] [place]    Office Visit    1 year ago Medicare annual wellness visit, initial    [facility] [address] [place], [facility]    Office Visit    2 years ago Encounter for examination required by Department of Transportation (DOT)    [facility], [place], Minhxuyen, PA-C    Office Visit    2 years ago Medicare annual wellness visit, initial    [facility] [address] [place], MD    Office Visit    2 years ago Type 2 diabetes mellitus without complication, without long-term current use of insulin ([facility])    [facility] [address] Ric[place], MD    Office Visit                      Passed - EGFRCR or GFRNAA > 50     GFR Evaluation  EGFRCR: 77 , resulted on 5/26/2023            AMLODIPINE 10 MG Oral Tab [Pharmacy Med Name: AMLODIPINE BESYLATE 10MG TABLETS] 90 tablet 2     Sig: TAKE 1 TABLET(10 MG) BY MOUTH DAILY       Hypertension Medications Protocol Passed - 2/25/2024  6:08 AM        Passed - CMP or BMP in past 12 months        Passed - Last BP reading less than 140/90     BP Readings from Last 1 Encounters:   05/26/23 133/68               Passed - In person appointment or  virtual visit in the past 12 mos or appointment in next 3 mos     Recent Outpatient Visits              9 months ago Medicare annual wellness visit, initial    [facility] [place], Ricardo, MD    Office Visit    1 year ago Medicare annual wellness visit, initial    [facility] [place] [facility], Ricardo, MD    Office Visit    2 years ago Encounter for examination required by Department of Transportation (DOT)    [facility], [place] Nguyen, Minhxuyen, PA-C    Office Visit    2 years ago Medicare annual wellness visit, initial    [facility] [place] [facility], Ricardo, MD    Office Visit    2 years ago Type 2 diabetes mellitus without complication, without long-term current use of insulin ([facility])    [facility] [place] Martinez, Ricardo, MD    Office Visit                      Passed - EGFRCR or GFRNAA > 50     GFR Evaluation  EGFRCR: 77 , resulted on 5/26/2023            CLOPIDOGREL 75 MG Oral Tab [Pharmacy Med Name: CLOPIDOGREL 75MG TABLETS] 90 tablet 2     Sig: TAKE 1 TABLET(75 MG) BY MOUTH DAILY       There is no refill protocol information for this order            Recent Outpatient Visits              9 months ago Medicare annual wellness visit, initial    [facility] [place] Martinez, Ricardo, MD    Office Visit    1 year ago Medicare annual wellness visit, initial    [facility] [place] Martinez, Ricardo, MD    Office Visit    2 years ago Encounter for examination required by Department of Transportation (DOT)    [facility], [place], Minhxuyen, PA-C    Office Visit    2 years ago Medicare annual wellness visit, initial    [facility] [place] [facility]    Office Visit    2 years ago Type 2 diabetes  mellitus without complication, without long-term current use of insulin (HCC)    [facility], [address], Richard Araujo MD    Office Visit

## 2024-02-28 DIAGNOSIS — I25.10 CORONARY ARTERY DISEASE INVOLVING NATIVE HEART WITHOUT ANGINA PECTORIS, UNSPECIFIED VESSEL OR LESION TYPE: ICD-10-CM

## 2024-02-28 NOTE — TELEPHONE ENCOUNTER
Routed to RN triage to run for protocol , thanks.       Requested Prescriptions     Pending Prescriptions Disp Refills    nebivolol (BYSTOLIC) 10 MG Oral Tab 90 tablet 3     Sig: Take 1 tablet (10 mg total) by mouth daily.       Last Office Visit with PCP: 5/26/2023

## 2024-02-28 NOTE — TELEPHONE ENCOUNTER
Pt is requesting refill for the following medication          nebivolol (BYSTOLIC) 10 MG Oral Tab, Take 1 tablet (10 mg total) by mouth daily., Disp: 90 tablet, Rfl: 2

## 2024-02-29 RX ORDER — NEBIVOLOL 10 MG/1
10 TABLET ORAL DAILY
Qty: 90 TABLET | Refills: 3 | Status: SHIPPED | OUTPATIENT
Start: 2024-02-29

## 2024-02-29 NOTE — TELEPHONE ENCOUNTER
Refill passed per [facility] protocol.    Requested Prescriptions   Pending Prescriptions Disp Refills    nebivolol (BYSTOLIC) 10 MG Oral Tab 90 tablet 3     Sig: Take 1 tablet (10 mg total) by mouth daily.       Hypertension Medications Protocol Passed - 2/28/2024  4:10 PM        Passed - CMP or BMP in past 12 months        Passed - Last BP reading less than 140/90     BP Readings from Last 1 Encounters:   05/26/23 133/68               Passed - In person appointment or virtual visit in the past 12 mos or appointment in next 3 mos     Recent Outpatient Visits              9 months ago Medicare annual wellness visit, initial    [facility] [place] Solitario Ric[place], MD    Office Visit    1 year ago Medicare annual wellness visit, initial    [facility] [place] [facility] Ric[facility], [facility]    Office Visit    2 years ago Encounter for examination required by Department of Transportation (DOT)    [facility], [place], Minhxuyen, PA-C    Office Visit    2 years ago Medicare annual wellness visit, initial    [facility] [place] Nik[place] Solitario Ricardo, MD    Office Visit    2 years ago Type 2 diabetes mellitus without complication, without long-term current use of insulin ([facility])    [facility] [place] Nik[place] Solitario Ricardo, MD    Office Visit          Future Appointments         Provider Department Appt Notes    In 2 weeks Richard Key [facility], last px:05/26/23, policy informed               Passed - EGFRCR or GFRNAA > 50     GFR Evaluation  EGFRCR: 77 , resulted on 5/26/2023             Recent Outpatient Visits              9 months ago Medicare annual wellness visit, initial    [facility] [address] [place]    Office Visit    1 year ago Medicare annual wellness visit,  initial    [facility] [place] [facility]    Office Visit    2 years ago Encounter for examination required by Department of Transportation (DOT)    [facility], [place], Minhxuyen, PA-C    Office Visit    2 years ago Medicare annual wellness visit, initial    [facility] [place] [facility]    Office Visit    2 years ago Type 2 diabetes mellitus without complication, without long-term current use of insulin (HCC)    [facility] [place] [facility]    Office Visit          Future Appointments         Provider Department Appt Notes    In 2 weeks [facility] [address], [place] px, last px:05/26/23, policy informed

## 2024-03-07 ENCOUNTER — TELEPHONE (OUTPATIENT)
Dept: FAMILY MEDICINE CLINIC | Facility: CLINIC | Age: 69
End: 2024-03-07

## 2024-03-07 NOTE — TELEPHONE ENCOUNTER
Patient (name and  verified) calling to say that the pharmacy states that the do not have the Rx for Synjardy, last prescribed on 01/15/24 with 1 refill.     Called [facility] to verify information, hold time over 5 mins, will call back.   Spoke with Dot she will work on the refill.     Called patient back to inform, verbalized understanding.     Noted:  Future Appointments   Date Time Provider Department Center   3/20/2024 10:00 AM Richard Key MD [facility]

## 2024-03-20 ENCOUNTER — LAB ENCOUNTER (OUTPATIENT)
Dept: LAB | Age: 69
End: 2024-03-20
Attending: FAMILY MEDICINE
Payer: MEDICARE

## 2024-03-20 ENCOUNTER — OFFICE VISIT (OUTPATIENT)
Dept: FAMILY MEDICINE CLINIC | Facility: CLINIC | Age: 69
End: 2024-03-20

## 2024-03-20 ENCOUNTER — EKG ENCOUNTER (OUTPATIENT)
Dept: LAB | Age: 69
End: 2024-03-20
Attending: FAMILY MEDICINE
Payer: MEDICARE

## 2024-03-20 VITALS
BODY MASS INDEX: 38.62 KG/M2 | DIASTOLIC BLOOD PRESSURE: 72 MMHG | HEIGHT: 73 IN | SYSTOLIC BLOOD PRESSURE: 122 MMHG | HEART RATE: 58 BPM | WEIGHT: 291.38 LBS

## 2024-03-20 DIAGNOSIS — E11.9 TYPE 2 DIABETES MELLITUS WITHOUT COMPLICATION, WITHOUT LONG-TERM CURRENT USE OF INSULIN (HCC): ICD-10-CM

## 2024-03-20 DIAGNOSIS — I25.10 CORONARY ARTERY DISEASE INVOLVING NATIVE HEART WITHOUT ANGINA PECTORIS, UNSPECIFIED VESSEL OR LESION TYPE: ICD-10-CM

## 2024-03-20 DIAGNOSIS — Z91.81 RISK FOR FALLS: ICD-10-CM

## 2024-03-20 DIAGNOSIS — R35.1 NOCTURIA: ICD-10-CM

## 2024-03-20 DIAGNOSIS — Z00.00 MEDICARE ANNUAL WELLNESS VISIT, INITIAL: Primary | ICD-10-CM

## 2024-03-20 DIAGNOSIS — G56.02 CARPAL TUNNEL SYNDROME OF LEFT WRIST: ICD-10-CM

## 2024-03-20 DIAGNOSIS — G47.33 OSA (OBSTRUCTIVE SLEEP APNEA): ICD-10-CM

## 2024-03-20 DIAGNOSIS — Z12.11 COLON CANCER SCREENING: ICD-10-CM

## 2024-03-20 DIAGNOSIS — I10 ESSENTIAL HYPERTENSION: ICD-10-CM

## 2024-03-20 DIAGNOSIS — E66.01 CLASS 2 SEVERE OBESITY DUE TO EXCESS CALORIES WITH SERIOUS COMORBIDITY AND BODY MASS INDEX (BMI) OF 38.0 TO 38.9 IN ADULT (HCC): ICD-10-CM

## 2024-03-20 DIAGNOSIS — E55.9 VITAMIN D DEFICIENCY: ICD-10-CM

## 2024-03-20 DIAGNOSIS — M62.838 MUSCLE SPASTICITY: ICD-10-CM

## 2024-03-20 DIAGNOSIS — K21.9 GASTROESOPHAGEAL REFLUX DISEASE WITHOUT ESOPHAGITIS: ICD-10-CM

## 2024-03-20 LAB
ALBUMIN SERPL-MCNC: 5 G/DL (ref 3.2–4.8)
ALBUMIN/GLOB SERPL: 1.6 {RATIO} (ref 1–2)
ALP LIVER SERPL-CCNC: 95 U/L
ALT SERPL-CCNC: 30 U/L
ANION GAP SERPL CALC-SCNC: 9 MMOL/L (ref 0–18)
AST SERPL-CCNC: 22 U/L (ref ?–34)
ATRIAL RATE: 57 BPM
BASOPHILS # BLD AUTO: 0.06 X10(3) UL (ref 0–0.2)
BASOPHILS NFR BLD AUTO: 0.6 %
BILIRUB SERPL-MCNC: 0.4 MG/DL (ref 0.2–1.1)
BILIRUB UR QL: NEGATIVE
BUN BLD-MCNC: 20 MG/DL (ref 9–23)
BUN/CREAT SERPL: 18.3 (ref 10–20)
CALCIUM BLD-MCNC: 9.5 MG/DL (ref 8.7–10.4)
CHLORIDE SERPL-SCNC: 106 MMOL/L (ref 98–112)
CHOLEST SERPL-MCNC: 155 MG/DL (ref ?–200)
CLARITY UR: CLEAR
CO2 SERPL-SCNC: 22 MMOL/L (ref 21–32)
COLOR UR: YELLOW
CREAT BLD-MCNC: 1.09 MG/DL
DEPRECATED RDW RBC AUTO: 47 FL (ref 35.1–46.3)
EGFRCR SERPLBLD CKD-EPI 2021: 73 ML/MIN/1.73M2 (ref 60–?)
EOSINOPHIL # BLD AUTO: 0.43 X10(3) UL (ref 0–0.7)
EOSINOPHIL NFR BLD AUTO: 4.1 %
ERYTHROCYTE [DISTWIDTH] IN BLOOD BY AUTOMATED COUNT: 14.6 % (ref 11–15)
EST. AVERAGE GLUCOSE BLD GHB EST-MCNC: 157 MG/DL (ref 68–126)
FASTING PATIENT LIPID ANSWER: YES
FASTING STATUS PATIENT QL REPORTED: YES
GLOBULIN PLAS-MCNC: 3.2 G/DL (ref 2.8–4.4)
GLUCOSE BLD-MCNC: 118 MG/DL (ref 70–99)
GLUCOSE UR-MCNC: >1000 MG/DL
HBA1C MFR BLD: 7.1 % (ref ?–5.7)
HCT VFR BLD AUTO: 46.6 %
HDLC SERPL-MCNC: 41 MG/DL (ref 40–59)
HGB BLD-MCNC: 15.9 G/DL
HGB UR QL STRIP.AUTO: NEGATIVE
IMM GRANULOCYTES # BLD AUTO: 0.02 X10(3) UL (ref 0–1)
IMM GRANULOCYTES NFR BLD: 0.2 %
KETONES UR-MCNC: NEGATIVE MG/DL
LDLC SERPL CALC-MCNC: 89 MG/DL (ref ?–100)
LEUKOCYTE ESTERASE UR QL STRIP.AUTO: NEGATIVE
LYMPHOCYTES # BLD AUTO: 2.45 X10(3) UL (ref 1–4)
LYMPHOCYTES NFR BLD AUTO: 23.6 %
MCH RBC QN AUTO: 29.8 PG (ref 26–34)
MCHC RBC AUTO-ENTMCNC: 34.1 G/DL (ref 31–37)
MCV RBC AUTO: 87.4 FL
MONOCYTES # BLD AUTO: 0.83 X10(3) UL (ref 0.1–1)
MONOCYTES NFR BLD AUTO: 8 %
NEUTROPHILS # BLD AUTO: 6.59 X10 (3) UL (ref 1.5–7.7)
NEUTROPHILS # BLD AUTO: 6.59 X10(3) UL (ref 1.5–7.7)
NEUTROPHILS NFR BLD AUTO: 63.5 %
NITRITE UR QL STRIP.AUTO: NEGATIVE
NONHDLC SERPL-MCNC: 114 MG/DL (ref ?–130)
OSMOLALITY SERPL CALC.SUM OF ELEC: 288 MOSM/KG (ref 275–295)
P AXIS: 32 DEGREES
P-R INTERVAL: 192 MS
PH UR: 5.5 [PH] (ref 5–8)
PLATELET # BLD AUTO: 269 10(3)UL (ref 150–450)
POTASSIUM SERPL-SCNC: 4.5 MMOL/L (ref 3.5–5.1)
PROT SERPL-MCNC: 8.2 G/DL (ref 5.7–8.2)
PROT UR-MCNC: 20 MG/DL
PSA SERPL-MCNC: 0.2 NG/ML (ref ?–4)
Q-T INTERVAL: 384 MS
QRS DURATION: 100 MS
QTC CALCULATION (BEZET): 373 MS
R AXIS: -62 DEGREES
RBC # BLD AUTO: 5.33 X10(6)UL
SODIUM SERPL-SCNC: 137 MMOL/L (ref 136–145)
SP GR UR STRIP: >1.03 (ref 1–1.03)
T AXIS: 59 DEGREES
TRIGL SERPL-MCNC: 142 MG/DL (ref 30–149)
TSI SER-ACNC: 1.92 MIU/ML (ref 0.55–4.78)
UROBILINOGEN UR STRIP-ACNC: NORMAL
VENTRICULAR RATE: 57 BPM
VIT D+METAB SERPL-MCNC: 16.6 NG/ML (ref 30–100)
VLDLC SERPL CALC-MCNC: 23 MG/DL (ref 0–30)
WBC # BLD AUTO: 10.4 X10(3) UL (ref 4–11)

## 2024-03-20 PROCEDURE — 84153 ASSAY OF PSA TOTAL: CPT

## 2024-03-20 PROCEDURE — 93005 ELECTROCARDIOGRAM TRACING: CPT

## 2024-03-20 PROCEDURE — 83036 HEMOGLOBIN GLYCOSYLATED A1C: CPT

## 2024-03-20 PROCEDURE — 85025 COMPLETE CBC W/AUTO DIFF WBC: CPT

## 2024-03-20 PROCEDURE — 93010 ELECTROCARDIOGRAM REPORT: CPT | Performed by: INTERNAL MEDICINE

## 2024-03-20 PROCEDURE — 84443 ASSAY THYROID STIM HORMONE: CPT

## 2024-03-20 PROCEDURE — 81001 URINALYSIS AUTO W/SCOPE: CPT

## 2024-03-20 PROCEDURE — 80053 COMPREHEN METABOLIC PANEL: CPT

## 2024-03-20 PROCEDURE — 80061 LIPID PANEL: CPT

## 2024-03-20 PROCEDURE — 82306 VITAMIN D 25 HYDROXY: CPT

## 2024-03-20 PROCEDURE — 36415 COLL VENOUS BLD VENIPUNCTURE: CPT

## 2024-03-20 RX ORDER — SIMVASTATIN 20 MG
20 TABLET ORAL NIGHTLY
Qty: 90 TABLET | Refills: 3 | Status: SHIPPED | OUTPATIENT
Start: 2024-03-20

## 2024-03-20 RX ORDER — CLOPIDOGREL BISULFATE 75 MG/1
75 TABLET ORAL DAILY
Qty: 90 TABLET | Refills: 0 | Status: SHIPPED | OUTPATIENT
Start: 2024-03-20

## 2024-03-20 RX ORDER — NEBIVOLOL 10 MG/1
10 TABLET ORAL DAILY
Qty: 90 TABLET | Refills: 3 | Status: SHIPPED | OUTPATIENT
Start: 2024-03-20

## 2024-03-20 RX ORDER — OMEPRAZOLE 40 MG/1
40 CAPSULE, DELAYED RELEASE ORAL DAILY
Qty: 90 CAPSULE | Refills: 3 | Status: SHIPPED | OUTPATIENT
Start: 2024-03-20

## 2024-03-20 RX ORDER — NAPROXEN 500 MG/1
500 TABLET ORAL 2 TIMES DAILY WITH MEALS
Qty: 60 TABLET | Refills: 1 | Status: SHIPPED | OUTPATIENT
Start: 2024-03-20

## 2024-03-20 RX ORDER — EMPAGLIFLOZIN AND METFORMIN HYDROCHLORIDE 12.5; 1 MG/1; MG/1
1 TABLET ORAL 2 TIMES DAILY
Qty: 180 TABLET | Refills: 1 | Status: SHIPPED | OUTPATIENT
Start: 2024-03-20

## 2024-03-20 RX ORDER — TORSEMIDE 20 MG/1
20 TABLET ORAL DAILY
Qty: 90 TABLET | Refills: 3 | Status: SHIPPED | OUTPATIENT
Start: 2024-03-20

## 2024-03-20 RX ORDER — BLOOD SUGAR DIAGNOSTIC
1 STRIP MISCELLANEOUS 2 TIMES DAILY
Qty: 100 STRIP | Refills: 2 | Status: SHIPPED | OUTPATIENT
Start: 2024-03-20

## 2024-03-20 RX ORDER — AMLODIPINE BESYLATE 10 MG/1
10 TABLET ORAL DAILY
Qty: 90 TABLET | Refills: 0 | Status: SHIPPED | OUTPATIENT
Start: 2024-03-20

## 2024-03-20 RX ORDER — LISINOPRIL 20 MG/1
20 TABLET ORAL DAILY
Qty: 90 TABLET | Refills: 0 | Status: SHIPPED | OUTPATIENT
Start: 2024-03-20

## 2024-03-20 RX ORDER — LANCETS
EACH MISCELLANEOUS
Qty: 100 EACH | Refills: 3 | Status: SHIPPED | OUTPATIENT
Start: 2024-03-20

## 2024-03-20 RX ORDER — ERGOCALCIFEROL 1.25 MG/1
50000 CAPSULE ORAL WEEKLY
Qty: 12 CAPSULE | Refills: 3 | Status: SHIPPED | OUTPATIENT
Start: 2024-03-20 | End: 2025-03-20

## 2024-03-20 NOTE — PROGRESS NOTES
Please notify patient that his/her blood test showed low levels of vitamin D. A prescription was sent to his pharmacy to take one capsule or tablet, once a week. This Rx is good for one year. We'll recheck levels in one year.    Normal: CBC, CMP, Lipids, TSH.  A1c= diabetes ok control, more diet restriction.

## 2024-03-20 NOTE — PROGRESS NOTES
Tan Beasley is a 68 year old male who presents for a Medicare Subsequent Annual Wellness visit (Pt already had Initial Annual Wellness) and scheduled follow up of multiple significant but stable problems.      Tan Beasley is a 67 year old male is here for routine periodic health screening and examination.  His last physical exam was 1years ago.  His last ECG was 1 years ago and was abnormal: Hx CAD. His last diabetes screening test was 1 years ago and was abnormal: Hx Diabetes.   His last cholesterol test was 1 year ago and was abnormal: Hx hyperlipidemia.  Last dentist visit was many months ago.  He is not current with his Td immunization.  He is not current with his Flu vaccination.  Patient last colonoscopy was none. He is not a smoker.         History/Other:   Fall Risk Assessment:   He has been screened for Falls and is low risk.      Cognitive Assessment:   He had a completely normal cognitive assessment - see flowsheet entries       Functional Ability/Status:   Tan Beasley has some abnormal functions as listed below:  He has Hearing problems based on screening of functional status.      Depression Screening (PHQ-2/PHQ-9): PHQ-2 SCORE: 0  , done 3/20/2024             Advanced Directives:   He does NOT have a Living Will. [Do you have a living will?: No]  He does NOT have a Power of  for Health Care. [Do you have a healthcare power of ?: No]  Discussed Advance Care Planning with patient (and family/surrogate if present). Standard forms made available to patient in After Visit Summary.      Patient Active Problem List   Diagnosis    Hypertension    Dyspnea on exertion    Severe obesity (BMI 35.0-39.9) with comorbidity (HCC)     Allergies:  He has No Known Allergies.    Current Medications:  Outpatient Medications Marked as Taking for the 3/20/24 encounter (Office Visit) with Richard Key MD   Medication Sig    Accu-Chek Softclix Lancets Does not apply Misc Use BID    amLODIPine 10  MG Oral Tab Take 1 tablet (10 mg total) by mouth daily.    clopidogrel 75 MG Oral Tab Take 1 tablet (75 mg total) by mouth daily.    Glucose Blood (ACCU-CHEK ANDREW PLUS) In Vitro Strip 1 lancet  by Finger stick route 2 (two) times daily.    lisinopril 20 MG Oral Tab Take 1 tablet (20 mg total) by mouth daily.    naproxen 500 MG Oral Tab Take 1 tablet (500 mg total) by mouth 2 (two) times daily with meals.    nebivolol (BYSTOLIC) 10 MG Oral Tab Take 1 tablet (10 mg total) by mouth daily.    Omeprazole 40 MG Oral Capsule Delayed Release Take 1 capsule (40 mg total) by mouth daily.    simvastatin 20 MG Oral Tab Take 1 tablet (20 mg total) by mouth nightly.    SYNJARDY 12.5-1000 MG Oral Tab Take 1 tablet by mouth 2 (two) times daily.    torsemide 20 MG Oral Tab Take 1 tablet (20 mg total) by mouth daily.    ergocalciferol 1.25 MG (29809 UT) Oral Cap Take 1 capsule (50,000 Units total) by mouth once a week.       Medical History:  He  has a past medical history of Diabetes (HCC), Essential hypertension, and Prostate infection (2016).  Surgical History:  He  has no past surgical history on file.   Family History:  His family history includes Cancer in his mother; Hypertension in his sister; Lipids in his sister.  Social History:  He  reports that he has quit smoking. He has never used smokeless tobacco. He reports current alcohol use. He reports that he does not use drugs.    Tobacco:  He smoked tobacco in the past but quit greater than 12 months ago.  Social History    Tobacco Use      Smoking status: Former      Smokeless tobacco: Never       CAGE Alcohol Screen:   CAGE screening score of 0 on 3/20/2024, showing low risk of alcohol abuse.      Patient Care Team:  Richard Key MD as PCP - General (Family Medicine)    Review of Systems   Constitutional:  Negative for appetite change, fatigue and fever.   HENT:  Negative for hearing loss and nosebleeds.    Eyes:  Negative for pain and visual disturbance.    Respiratory:  Negative for apnea and shortness of breath.    Cardiovascular:  Negative for chest pain, palpitations and leg swelling.   Gastrointestinal:  Negative for abdominal pain, blood in stool, constipation, diarrhea, nausea and vomiting.   Endocrine: Negative for polydipsia and polyuria.   Genitourinary:  Negative for decreased urine volume, frequency and hematuria.        No nocturia   Musculoskeletal:  Negative for arthralgias.   Skin:  Negative for rash.   Neurological:  Negative for dizziness, syncope and headaches.   Psychiatric/Behavioral:  Negative for dysphoric mood and sleep disturbance.           Objective:   Physical Exam  Vitals reviewed.   Constitutional:       Appearance: Normal appearance.      Comments:      HENT:      Head: Normocephalic.      Right Ear: Hearing, tympanic membrane and ear canal normal.      Left Ear: Hearing, tympanic membrane and ear canal normal.      Nose: Nose normal. No rhinorrhea.      Mouth/Throat:      Mouth: Mucous membranes are moist.      Pharynx: Oropharynx is clear.   Eyes:      Extraocular Movements: Extraocular movements intact.      Conjunctiva/sclera: Conjunctivae normal.      Pupils: Pupils are equal, round, and reactive to light.   Neck:      Thyroid: No thyroid mass.      Vascular: No carotid bruit.      Comments: Neck facial muscle spasms   Cardiovascular:      Rate and Rhythm: Normal rate and regular rhythm.      Heart sounds: Normal heart sounds, S1 normal and S2 normal. No murmur heard.  Pulmonary:      Effort: Pulmonary effort is normal.      Breath sounds: Normal breath sounds.   Abdominal:      General: Abdomen is flat. Bowel sounds are normal.      Palpations: Abdomen is soft. There is no hepatomegaly, splenomegaly or mass.      Tenderness: There is no abdominal tenderness.      Hernia: No hernia is present.   Musculoskeletal:      Cervical back: Neck supple.      Comments: Spinal exam without scoliosis.      Feet:      Right foot:       Protective Sensation: 10 sites tested.  10 sites sensed.      Left foot:      Protective Sensation: 10 sites tested.  10 sites sensed.   Lymphadenopathy:      Cervical: No cervical adenopathy.   Skin:     General: Skin is warm.      Findings: No rash.   Neurological:      General: No focal deficit present.      Mental Status: He is alert.      Deep Tendon Reflexes:      Reflex Scores:       Patellar reflexes are 2+ on the right side and 2+ on the left side.  Psychiatric:         Attention and Perception: Attention normal.         Mood and Affect: Mood and affect normal.          /72   Pulse 58   Ht 6' 1\" (1.854 m)   Wt 291 lb 6.4 oz (132.2 kg)   BMI 38.45 kg/m²  Estimated body mass index is 38.45 kg/m² as calculated from the following:    Height as of this encounter: 6' 1\" (1.854 m).    Weight as of this encounter: 291 lb 6.4 oz (132.2 kg).    Medicare Hearing Assessment:   Hearing Screening    Screening Method: Questionnaire  I have a problem hearing over the telephone: No I have trouble following the conversations when two or more people are talking at the same time: No   I have trouble understanding things on the TV: Yes I have to strain to understand conversations: Yes   I have to worry about missing the telephone ring or doorbell: No I have trouble hearing conversations in a noisy background such as a crowded room or restaurant: No   I get confused about where sounds come from: No I misunderstand some words in a sentence and need to ask people to repeat themselves: No   I especially have trouble understanding the speech of women and children: No I have trouble understanding the speaker in a large room such as at a meeting or place of Holiness: No   Many people I talk to seem to mumble (or don't speak clearly): No People get annoyed because I misunderstand what they say: No   I misunderstand what others are saying and make inappropriate responses: No I avoid social activities because I cannot hear well  and fear I will reply improperly: No   Family members and friends have told me they think I may have hearing loss: Yes             Visual Acuity:   Right Eye Visual Acuity: Uncorrected Right Eye Chart Acuity: 20/20   Left Eye Visual Acuity: Uncorrected Left Eye Chart Acuity: 20/20   Both Eyes Visual Acuity: Uncorrected Both Eyes Chart Acuity: 20/20   Able To Tolerate Visual Acuity: Yes        Assessment & Plan:   Tan Beasley is a 68 year old male who presents for a Medicare Assessment.     1. Medicare annual wellness visit, initial (Primary)  2. Type 2 diabetes mellitus without complication, without long-term current use of insulin ([facility])  -     Hemoglobin A1C; Future; Expected date: 03/20/2024  -     TSH W Reflex To Free T4; Future; Expected date: 03/20/2024  -     Urinalysis with Culture Reflex; Future; Expected date: 03/20/2024  3. Class 2 severe obesity due to excess calories with serious comorbidity and body mass index (BMI) of 38.0 to 38.9 in adult ([facility])  4. Coronary artery disease involving native heart without angina pectoris, unspecified vessel or lesion type  -     CBC With Differential With Platelet; Future; Expected date: 03/20/2024  -     Comp Metabolic Panel (14); Future; Expected date: 03/20/2024  -     Lipid Panel; Future; Expected date: 03/20/2024  -     EKG 12 Lead; Future; Expected date: 03/20/2024  -     Clopidogrel Bisulfate; Take 1 tablet (75 mg total) by mouth daily.  Dispense: 90 tablet; Refill: 0  -     Lisinopril; Take 1 tablet (20 mg total) by mouth daily.  Dispense: 90 tablet; Refill: 0  -     Nebivolol HCl; Take 1 tablet (10 mg total) by mouth daily.  Dispense: 90 tablet; Refill: 3  -     Simvastatin; Take 1 tablet (20 mg total) by mouth nightly.  Dispense: 90 tablet; Refill: 3  -     Torsemide; Take 1 tablet (20 mg total) by mouth daily.  Dispense: 90 tablet; Refill: 3  5. Essential hypertension  6. LUIS DANIEL (obstructive sleep apnea)  7. Carpal tunnel syndrome of left wrist  8. Colon cancer  screening  -     COLOGUARD COLON CANCER SCREENING (EXTERNAL)  9. Gastroesophageal reflux disease without esophagitis  10. Nocturia  -     PSA, Total W Reflex To Free; Future; Expected date: 03/20/2024  11. Vitamin D deficiency  -     Vitamin D; Future; Expected date: 03/20/2024  12. Risk for falls  13. Muscle spasticity  -     NEURO - INTERNAL  Other orders  -     Accu-Chek Softclix Lancets; Use BID  Dispense: 100 each; Refill: 3  -     amLODIPine Besylate; Take 1 tablet (10 mg total) by mouth daily.  Dispense: 90 tablet; Refill: 0  -     Accu-Chek Rosey Plus; 1 lancet  by Finger stick route 2 (two) times daily.  Dispense: 100 strip; Refill: 2  -     Naproxen; Take 1 tablet (500 mg total) by mouth 2 (two) times daily with meals.  Dispense: 60 tablet; Refill: 1  -     Omeprazole; Take 1 capsule (40 mg total) by mouth daily.  Dispense: 90 capsule; Refill: 3  -     Synjardy; Take 1 tablet by mouth 2 (two) times daily.  Dispense: 180 tablet; Refill: 1    1. Medicare annual wellness visit, initial      CPE PLAN:    LABS / TEST & ORDERS for today's visit :Blood test(s) ordered today for send out to [facility] lab:  Orders Placed This Encounter   Procedures    CBC With Differential With Platelet    Comp Metabolic Panel (14)    Hemoglobin A1C    Lipid Panel    PSA, Total W Reflex To Free    TSH W Reflex To Free T4    Vitamin D    Urinalysis with Culture Reflex      Referrals: COLOGUARD COLON CANCER SCREENING (EXTERNAL)  NEURO - INTERNAL  In-House; Urine dip.  Test/Procedures done today include: EKG.    IMMUNIZATIONS: none given today.    RECOMMENDATIONS given include: ANTICIPATORY GUIDANCE  topics covered today include: safety (i.e. seat belts, helmets, sunscreen, protective sports gear ), nutrition (i.e. healthy meals and snacks (i.e. avoid junk food and high-carbohydrate foods); athletic conditioning, fluids; low fat milk, limit to less than 20 oz. a day; dental care ), and Healthy habits& Social competence &  Responsibilities: Recommendations on physical activity; exercise daily or at least 3 times a week for 30-60 minutes doing cardiovascular exercise. Encourage to maintain the best physical and dental hygiene possible.      FOLLOW-UP: Schedule a follow-up visit in 12 months.     2. Type 2 diabetes mellitus without complication, without long-term current use of insulin ([facility])  Stable  CPM  Follow up [facility]  Lab:   - Hemoglobin A1C; Future  - TSH W Reflex To Free T4; Future  - Urinalysis with Culture Reflex; Future    3. Class 2 severe obesity due to excess calories with serious comorbidity and body mass index (BMI) of 38.0 to 38.9 in adult ([facility])  Weight loss plan    4. Coronary artery disease involving native heart without angina pectoris, unspecified vessel or lesion type  Stable  CPM  Follow up [facility]  Lab:   - CBC With Differential With Platelet; Future  - Comp Metabolic Panel (14); Future  - Lipid Panel; Future  - EKG 12 Lead; Future  Refills:   - clopidogrel 75 MG Oral Tab; Take 1 tablet (75 mg total) by mouth daily.  Dispense: 90 tablet; Refill: 0  - lisinopril 20 MG Oral Tab; Take 1 tablet (20 mg total) by mouth daily.  Dispense: 90 tablet; Refill: 0  - nebivolol (BYSTOLIC) 10 MG Oral Tab; Take 1 tablet (10 mg total) by mouth daily.  Dispense: 90 tablet; Refill: 3  - simvastatin 20 MG Oral Tab; Take 1 tablet (20 mg total) by mouth nightly.  Dispense: 90 tablet; Refill: 3  - torsemide 20 MG Oral Tab; Take 1 tablet (20 mg total) by mouth daily.  Dispense: 90 tablet; Refill: 3  Follow up with Cardiologist    5. Essential hypertension  Stable  CPM  Follow up [facility]      6. LUIS DANIEL (obstructive sleep apnea)  CPM    7. Carpal tunnel syndrome of left wrist  Resolved    8. Colon cancer screening  Orders:  - COLOGUARD COLON CANCER SCREENING (EXTERNAL)    9. Gastroesophageal reflux disease without esophagitis  Stable  CPM  Follow up [facility]    10. Nocturia  Lab:  PSA, Total W Reflex To Free; Future    11. Vitamin D deficiency  Lab:  Vitamin  D; Future    12. Risk for falls  Moderate risk, precautions    13. Muscle spasticity  Referral:  NEURO - INTERNAL         The patient indicates understanding of these issues and agrees to the plan.  Consult ordered.  Further testing ordered.  Imaging studies ordered.  Lab work ordered.  Reinforced healthy diet, lifestyle, and exercise.      Return in about 6 months (around 9/20/2024).     DANIS CORNEJO MD, 3/20/2024     Supplementary Documentation:   General Health:  In the past six months, have you lost more than 10 pounds without trying?: 2 - No  Has your appetite been poor?: No  Type of Diet: Balanced  How does the patient maintain a good energy level?: Stretching  How would you describe your daily physical activity?: Moderate  How would you describe your current health state?: Fair  How do you maintain positive mental well-being?: Games  On a scale of 0 to 10, with 0 being no pain and 10 being severe pain, what is your pain level?: 2 - (Mild)  In the past six months, have you experienced urine leakage?: 0-No  At any time do you feel concerned for the safety/well-being of yourself and/or your children, in your home or elsewhere?: No  Have you had any immunizations at another office such as Influenza, Hepatitis B, Tetanus, or Pneumococcal?: No        Tan Beasley's SCREENING SCHEDULE   Tests on this list are recommended by your physician but may not be covered, or covered at this frequency, by your insurer.   Please check with your insurance carrier before scheduling to verify coverage.   PREVENTATIVE SERVICES FREQUENCY &  COVERAGE DETAILS LAST COMPLETION DATE   Diabetes Screening    Fasting Blood Sugar / Glucose    One screening every 12 months if never tested or if previously tested but not diagnosed with pre-diabetes   One screening every 6 months if diagnosed with pre-diabetes Lab Results   Component Value Date     (H) 05/26/2023        Cardiovascular Disease Screening    Lipid  Panel  Cholesterol  Lipoprotein (HDL)  Triglycerides Covered every 5 years for all Medicare beneficiaries without apparent signs or symptoms of cardiovascular disease Lab Results   Component Value Date    CHOLEST 154 05/26/2023    HDL 45 05/26/2023    LDL 84 05/26/2023    TRIG 143 05/26/2023         Electrocardiogram (EKG)   Covered if needed at Welcome to Medicare, and non-screening if indicated for medical reasons 03/20/2024      Ultrasound Screening for Abdominal Aortic Aneurysm (AAA) Covered once in a lifetime for one of the following risk factors    Men who are 65-75 years old and have ever smoked    Anyone with a family history -     Colorectal Cancer Screening  Covered for ages 50-85; only need ONE of the following:    Colonoscopy   Covered every 10 years    Covered every 2 years if patient is at high risk or previous colonoscopy was abnormal -    Health Maintenance   Topic Date Due    Colorectal Cancer Screening  06/02/2024       Flexible Sigmoidoscopy   Covered every 4 years -    Fecal Occult Blood Test Covered annually 06/02/2023   Prostate Cancer Screening    Prostate-Specific Antigen (PSA) Annually Lab Results   Component Value Date    PSA 0.21 05/26/2023     Health Maintenance   Topic Date Due    PSA  05/26/2025      Immunizations    Influenza Covered once per flu season  Please get every year -  Influenza Vaccine(1) Never done    Pneumococcal Each vaccine ([id number] & [id number]) covered once after 65 Prevnar 13: -    [id number]: 11/02/2021     No recommendations at this time    Hepatitis B One screening covered for patients with certain risk factors   -  No recommendations at this time    Tetanus Toxoid Not covered by Medicare Part B unless medically necessary (cut with metal); may be covered with your pharmacy prescription benefits -    Tetanus, Diptheria and Pertusis TD and TDaP Not covered by Medicare Part B -  No recommendations at this time    Zoster Not covered by Medicare Part B; may be  covered with your pharmacy  prescription benefits -  Zoster Vaccines(2 of 2) due on 12/28/2021     Diabetes      Hemoglobin A1C Annually; if last result is elevated, may be asked to retest more frequently.    Medicare covers every 3 months Lab Results   Component Value Date     (H) 05/26/2023    A1C 6.5 (H) 05/26/2023       Hemoglobin A1C Test due on 11/26/2023    Creat/alb ratio Annually Lab Results   Component Value Date    MICROALBCREA 17.8 05/26/2023       LDL Annually Lab Results   Component Value Date    LDL 84 05/26/2023       Dilated Eye Exam Annually Last Diabetic Eye Exam:  Last Dilated Eye Exam: 02/13/24  Eye Exam shows Diabetic Retinopathy?: No       Annual Monitoring of Persistent Medications (ACE/ARB, digoxin diuretics, anticonvulsants)    Potassium Annually Lab Results   Component Value Date    K 4.3 05/26/2023         Creatinine   Annually Lab Results   Component Value Date    CREATSERUM 1.05 05/26/2023         BUN Annually Lab Results   Component Value Date    BUN 21 (H) 05/26/2023       Drug Serum Conc Annually No results found for: \"DIGOXIN\", \"DIG\", \"VALP\"

## 2024-03-26 ENCOUNTER — TELEPHONE (OUTPATIENT)
Dept: FAMILY MEDICINE CLINIC | Facility: CLINIC | Age: 69
End: 2024-03-26

## 2024-03-26 DIAGNOSIS — I25.10 CORONARY ARTERY DISEASE INVOLVING NATIVE HEART WITHOUT ANGINA PECTORIS, UNSPECIFIED VESSEL OR LESION TYPE: Primary | ICD-10-CM

## 2024-03-26 NOTE — TELEPHONE ENCOUNTER
Good Afternoon    Please review pended referral for Cardio, Dr Wilkes and add DX codes needed.    Thank you    Josseline  [facility]

## 2024-03-26 NOTE — TELEPHONE ENCOUNTER
Patient is requesting referral.     Name of specialist and specialty department : Dr. Martinez Wilkes / cardiology    Reason for visit with the specialist:   Abnormal EKG     Address of the specialist office:  [address]   [address]  [place] [address]    Appointment date: n/a       CSS informed patient the turnaround time for referral is 5-7 business days.  Patient was informed to check [facility] for referral status.

## 2024-05-28 RX ORDER — EMPAGLIFLOZIN AND METFORMIN HYDROCHLORIDE 12.5; 1 MG/1; MG/1
1 TABLET ORAL 2 TIMES DAILY
Qty: 180 TABLET | Refills: 3 | Status: SHIPPED | OUTPATIENT
Start: 2024-05-28

## 2024-05-28 NOTE — TELEPHONE ENCOUNTER
Refill passed per [facility] protocol.    Requested Prescriptions   Pending Prescriptions Disp Refills    SYNJARDY 12.5-1000 MG Oral Tab [Pharmacy Med Name: SYNJARDY 12.5-1000MG TABLETS] 180 tablet 1     Sig: TAKE 1 TABLET BY MOUTH TWICE DAILY       Diabetes Medication Protocol Passed - 5/25/2024  6:03 AM        Passed - Last A1C < 7.5 and within past 6 months     Lab Results   Component Value Date    A1C 7.1 (H) 03/20/2024             Passed - In person appointment or virtual visit in the past 6 mos or appointment in next 3 mos     Recent Outpatient Visits              2 months ago Medicare annual wellness visit, initial    [facility] Solitario Ricardo, MD    Office Visit    1 year ago Medicare annual wellness visit, initial    [facility] Solitario Ricardo, MD    Office Visit    1 year ago Medicare annual wellness visit, initial    [facility] Solitario Ricardo, MD    Office Visit    2 years ago Encounter for examination required by Department of Transportation (DOT)    [facility] [place] Nguyen, Minhxuyen, PA-C    Office Visit    2 years ago Medicare annual wellness visit, initial    [facility]    Office Visit                      Passed - Microalbumin procedure in past 12 months or taking ACE/ARB        Passed - EGFRCR or GFRNAA > 50     GFR Evaluation  EGFRCR: 73 , resulted on 3/20/2024          Passed - GFR in the past 12 months

## 2024-08-15 ENCOUNTER — TELEPHONE (OUTPATIENT)
Dept: FAMILY MEDICINE CLINIC | Facility: CLINIC | Age: 69
End: 2024-08-15

## 2024-08-15 PROBLEM — E11.9 TYPE 2 DIABETES MELLITUS (HCC): Status: ACTIVE | Noted: 2022-03-02

## 2024-08-15 PROBLEM — E66.01 SEVERE OBESITY (HCC): Status: ACTIVE | Noted: 2022-03-02

## 2024-08-15 PROBLEM — I10 HTN (HYPERTENSION): Status: ACTIVE | Noted: 2022-03-02

## 2024-08-15 NOTE — TELEPHONE ENCOUNTER
Patient called, verified Name and . States he was on vacation for the past 2 months but got sick while on vacation - had problems with his heart (heart failure) and was hospitalized in [place]. States a lot of his medications were changed and some of them will run out soon. Requesting to be seen for followup and to get refill of medication. Appointment scheduled.    Future Appointments   Date Time Provider Department Center   2024 10:20 AM Gary Marcus APRN [facility]

## 2024-08-17 NOTE — PROGRESS NOTES
Subjective:   Tan Beasley is a 68 year old male who presents for Medication Request ([place] gave him new medications )   Patient is a 68-year-old male past medical history consistent for diabetes mellitus type 2, obesity, hypertension, sleep apnea, CAD, reflux, vitamin D deficiency, and umbilical hernia.  Patient presents to office today for medication refill.  Review of telephone encounter reveals    \"Patient called, verified Name and . States he was on vacation for the past 2 months but got sick while on vacation - had problems with his heart (heart failure) and was hospitalized in [place]. States a lot of his medications were changed and some of them will run out soon. Requesting to be seen for followup and to get refill of medication. Appointment scheduled.\"    Patient states he was out in [place] he had mid chest pain and pressure. He had issues breathing and had shortness of breath. He went to the hospital 24 via ambulance. He spent a few hours in ED and then was admitted. They did an EKG and work up and found to be in afib without rvr. He was started on a blood thinner Pradaxa 150mg and meds changed and dced home. He was instructed to follow up for possible cardioversion. He just returned home. He has appt with cardiology tomorrow APRN for [place]. They stopped amlodipine, they stopped clopidogrel and placed on pradaxa,changed Nebivolol to 5 mg, Nonpres 50 mg, and Diuver 10 mg tab. Medications not carried in [place]. Non pres Eplerenone equivalent in [place]. Diuver loop diuretic likely similar to torsemide. Will Update nebivolol and await cardiology recs for further management. Patient reports he also had questionable [facility] in [place] [facility] States he received abx. Will test urine here as well. Review of labs HGB 9.0 will confirm. MCV 88.0. Trop 10.8 will reorder. CXR with congesitve changes. Ddimer 876 will recheck. will recheck,                 Past Medical History:     Diabetes (HCC)    Essential hypertension    Prostate infection      History reviewed. No pertinent surgical history.     History/Other:    Chief Complaint Reviewed and Verified  Nursing Notes Reviewed and   Verified  Tobacco Reviewed  Allergies Reviewed  Medications Reviewed    Problem List Reviewed  Medical History Reviewed  Surgical History   Reviewed  Family History Reviewed  Social History Reviewed         Tobacco:  He smoked tobacco in the past but quit greater than 12 months ago.  Social History     Tobacco Use   Smoking Status Former    Passive exposure: Never   Smokeless Tobacco Never        Current Outpatient Medications   Medication Sig Dispense Refill    SYNJARDY 12.5-1000 MG Oral Tab TAKE 1 TABLET BY MOUTH TWICE DAILY 180 tablet 3    Accu-Chek Softclix Lancets Does not apply Misc Use  each 3    amLODIPine 10 MG Oral Tab Take 1 tablet (10 mg total) by mouth daily. 90 tablet 0    clopidogrel 75 MG Oral Tab Take 1 tablet (75 mg total) by mouth daily. 90 tablet 0    Glucose Blood (ACCU-CHEK ANDREW PLUS) In Vitro Strip 1 lancet  by Finger stick route 2 (two) times daily. 100 strip 2    lisinopril 20 MG Oral Tab Take 1 tablet (20 mg total) by mouth daily. 90 tablet 0    naproxen 500 MG Oral Tab Take 1 tablet (500 mg total) by mouth 2 (two) times daily with meals. 60 tablet 1    nebivolol (BYSTOLIC) 10 MG Oral Tab Take 1 tablet (10 mg total) by mouth daily. 90 tablet 3    Omeprazole 40 MG Oral Capsule Delayed Release Take 1 capsule (40 mg total) by mouth daily. 90 capsule 3    simvastatin 20 MG Oral Tab Take 1 tablet (20 mg total) by mouth nightly. 90 tablet 3    torsemide 20 MG Oral Tab Take 1 tablet (20 mg total) by mouth daily. 90 tablet 3    ergocalciferol 1.25 MG (92291 UT) Oral Cap Take 1 capsule (50,000 Units total) by mouth once a week. 12 capsule 3    ergocalciferol 1.25 MG (79528 UT) Oral Cap Take 1 capsule (50,000 Units total) by mouth once a week.           Review of Systems:  Review  of Systems   Constitutional: Negative.  Negative for activity change, chills and fever.   HENT: Negative.  Negative for congestion, ear pain, postnasal drip, sinus pain, sore throat and trouble swallowing.    Respiratory: Negative.  Negative for cough, shortness of breath and wheezing.    Cardiovascular: Negative.  Negative for chest pain and leg swelling.   Gastrointestinal: Negative.  Negative for abdominal pain, blood in stool, constipation and diarrhea.   Endocrine: Negative.    Genitourinary: Negative.  Negative for difficulty urinating, dysuria and flank pain.   Musculoskeletal: Negative.  Negative for arthralgias, back pain and neck stiffness.   Skin: Negative.  Negative for color change and rash.   Neurological: Negative.  Negative for dizziness and headaches.   Hematological:  Negative for adenopathy.         Objective:   /76 (BP Location: Right arm, Patient Position: Sitting, Cuff Size: large)   Pulse 86   Ht 6' 1\" (1.854 m)   Wt 279 lb 6.4 oz (126.7 kg)   SpO2 96%   BMI 36.86 kg/m²  Estimated body mass index is 36.86 kg/m² as calculated from the following:    Height as of this encounter: 6' 1\" (1.854 m).    Weight as of this encounter: 279 lb 6.4 oz (126.7 kg).  Physical Exam  Vitals and nursing note reviewed.   Constitutional:       General: He is not in acute distress.     Appearance: He is obese.   HENT:      Head: Normocephalic and atraumatic.      Right Ear: Tympanic membrane, ear canal and external ear normal. There is no impacted cerumen.      Left Ear: Tympanic membrane, ear canal and external ear normal. There is no impacted cerumen.      Nose: Nose normal. No congestion or rhinorrhea.      Mouth/Throat:      Mouth: Mucous membranes are moist.      Pharynx: Oropharynx is clear. No oropharyngeal exudate or posterior oropharyngeal erythema.   Cardiovascular:      Rate and Rhythm: Normal rate. Rhythm irregular.      Heart sounds: Murmur heard.   Pulmonary:      Effort: Pulmonary effort is  normal. No respiratory distress.      Breath sounds: Normal breath sounds. No stridor. No wheezing or rhonchi.   Abdominal:      General: Abdomen is flat. Bowel sounds are normal.      Palpations: Abdomen is soft.   Musculoskeletal:         General: Normal range of motion.   Skin:     General: Skin is warm.      Capillary Refill: Capillary refill takes less than 2 seconds.   Neurological:      Mental Status: He is alert and oriented to person, place, and time.           Assessment & Plan:   1. Heart failure, unspecified HF chronicity, unspecified heart failure type (HCC) (Primary)  -     EKG 12 Lead; Future; Expected date: 08/19/2024  -     CBC With Differential With Platelet; Future; Expected date: 08/19/2024  -     Comp Metabolic Panel (14); Future; Expected date: 08/19/2024  -     Troponin I (High Sensitivity); Future; Expected date: 08/19/2024  -     BNP (Brain Natriuretic Peptide); Future; Expected date: 08/19/2024  2. Atrial fibrillation and flutter (HCC)  -     EKG 12 Lead; Future; Expected date: 08/19/2024  -     Troponin I (High Sensitivity); Future; Expected date: 08/19/2024  -     BNP (Brain Natriuretic Peptide); Future; Expected date: 08/19/2024  3. Hospital discharge follow-up  4. Type 2 diabetes mellitus without complication, unspecified whether long term insulin use (HCC)  -     Hemoglobin A1C; Future; Expected date: 08/19/2024  5. Urinary tract infection without hematuria, site unspecified  -     Urinalysis with Culture Reflex  6. Coronary artery disease involving native heart without angina pectoris, unspecified vessel or lesion type    1. Atrial fibrillation and flutter (HCC)  Patient with new diagnosis of atrial fibs/flutter in [place] on July 27, 2024.  Like related to her volume status with heart failure exacerbation with congestion on chest x-ray and elevated BNP up to 928.  Repeating test and imaging in [place].  Has appointment with MD Wilkes cardiology APRN tomorrow.  Will await  recommendations on medications moving forward since medications are in [place] and not [place].  - EKG 12 Lead; Future  - Troponin I (High Sensitivity) [E]; Future  - BNP (Brain Natriuretic Peptide) [E]; Future    2. Heart failure, unspecified HF chronicity, unspecified heart failure type (HCC)  BNP on 7/27/2024 in [place] 928  Heart failure exacerbation  Repeat workup in [place]  - EKG 12 Lead; Future  - CBC With Differential With Platelet; Future  - Comp Metabolic Panel (14) [E]; Future  - Troponin I (High Sensitivity) [E]; Future  - BNP (Brain Natriuretic Peptide) [E]; Future    3. Hospital discharge follow-up  Treatment: For heart failure/atrial fibrillation.  Medication changes made  Follow-up with cardiology    4. Type 2 diabetes mellitus without complication, unspecified whether long term insulin use ([facility])  Glucose and urine in [place].  Treated for UTI  Assess A1c on Synjardy  - Hemoglobin A1C; Future    5. Urinary tract infection without hematuria, site unspecified  Educated glucosuria is a normal part of being on Synjardy with SGLT2.  Will reassess urine [place]  - UA/M WITH CULTURE REFLEX [3020]    6. Coronary artery disease involving native heart without angina pectoris, unspecified vessel or lesion type  Follow-up with cardiology a.m.  Patient educated if chest pain, shortness of breath, symptoms return to proceed to nearest ER.    Patient aware of plan of care. All questions answered to satisfaction of the patient. Patient instructed to call office or reach out via [facility] if any issues arise. For urgent issues and/or reviewed red flags please proceed to the urgent care or ER.  Also, inform the nurse practitioner with any new symptoms or medication side effects.          Return if symptoms worsen or fail to improve.    Gary Marcus, LOBITO, 8/17/2024, 11:26 AM

## 2024-08-19 ENCOUNTER — EKG ENCOUNTER (OUTPATIENT)
Dept: LAB | Age: 69
End: 2024-08-19
Payer: MEDICARE

## 2024-08-19 ENCOUNTER — OFFICE VISIT (OUTPATIENT)
Dept: FAMILY MEDICINE CLINIC | Facility: CLINIC | Age: 69
End: 2024-08-19

## 2024-08-19 ENCOUNTER — LAB ENCOUNTER (OUTPATIENT)
Dept: LAB | Age: 69
End: 2024-08-19
Payer: MEDICARE

## 2024-08-19 ENCOUNTER — MED REC SCAN ONLY (OUTPATIENT)
Dept: FAMILY MEDICINE CLINIC | Facility: CLINIC | Age: 69
End: 2024-08-19

## 2024-08-19 VITALS
WEIGHT: 279.38 LBS | HEART RATE: 86 BPM | DIASTOLIC BLOOD PRESSURE: 76 MMHG | BODY MASS INDEX: 37.03 KG/M2 | HEIGHT: 73 IN | OXYGEN SATURATION: 96 % | SYSTOLIC BLOOD PRESSURE: 125 MMHG

## 2024-08-19 DIAGNOSIS — Z09 HOSPITAL DISCHARGE FOLLOW-UP: ICD-10-CM

## 2024-08-19 DIAGNOSIS — I50.9 HEART FAILURE, UNSPECIFIED HF CHRONICITY, UNSPECIFIED HEART FAILURE TYPE (HCC): ICD-10-CM

## 2024-08-19 DIAGNOSIS — E11.9 TYPE 2 DIABETES MELLITUS WITHOUT COMPLICATION, UNSPECIFIED WHETHER LONG TERM INSULIN USE (HCC): ICD-10-CM

## 2024-08-19 DIAGNOSIS — I48.92 ATRIAL FIBRILLATION AND FLUTTER (HCC): ICD-10-CM

## 2024-08-19 DIAGNOSIS — I25.10 CORONARY ARTERY DISEASE INVOLVING NATIVE HEART WITHOUT ANGINA PECTORIS, UNSPECIFIED VESSEL OR LESION TYPE: ICD-10-CM

## 2024-08-19 DIAGNOSIS — I48.91 ATRIAL FIBRILLATION AND FLUTTER (HCC): ICD-10-CM

## 2024-08-19 DIAGNOSIS — I50.9 HEART FAILURE, UNSPECIFIED HF CHRONICITY, UNSPECIFIED HEART FAILURE TYPE (HCC): Primary | ICD-10-CM

## 2024-08-19 DIAGNOSIS — N39.0 URINARY TRACT INFECTION WITHOUT HEMATURIA, SITE UNSPECIFIED: ICD-10-CM

## 2024-08-19 LAB
ALBUMIN SERPL-MCNC: 4.8 G/DL (ref 3.2–4.8)
ALBUMIN/GLOB SERPL: 1.3 {RATIO} (ref 1–2)
ALP LIVER SERPL-CCNC: 85 U/L
ALT SERPL-CCNC: 32 U/L
ANION GAP SERPL CALC-SCNC: 11 MMOL/L (ref 0–18)
AST SERPL-CCNC: 23 U/L (ref ?–34)
BASOPHILS # BLD AUTO: 0.06 X10(3) UL (ref 0–0.2)
BASOPHILS NFR BLD AUTO: 0.7 %
BILIRUB SERPL-MCNC: 0.3 MG/DL (ref 0.2–1.1)
BILIRUB UR QL: NEGATIVE
BNP SERPL-MCNC: 118 PG/ML
BUN BLD-MCNC: 22 MG/DL (ref 9–23)
BUN/CREAT SERPL: 18.3 (ref 10–20)
CALCIUM BLD-MCNC: 9.8 MG/DL (ref 8.7–10.4)
CHLORIDE SERPL-SCNC: 105 MMOL/L (ref 98–112)
CLARITY UR: CLEAR
CO2 SERPL-SCNC: 23 MMOL/L (ref 21–32)
COLOR UR: YELLOW
CREAT BLD-MCNC: 1.2 MG/DL
DEPRECATED RDW RBC AUTO: 47 FL (ref 35.1–46.3)
EGFRCR SERPLBLD CKD-EPI 2021: 65 ML/MIN/1.73M2 (ref 60–?)
EOSINOPHIL # BLD AUTO: 0.57 X10(3) UL (ref 0–0.7)
EOSINOPHIL NFR BLD AUTO: 6.4 %
ERYTHROCYTE [DISTWIDTH] IN BLOOD BY AUTOMATED COUNT: 14.7 % (ref 11–15)
EST. AVERAGE GLUCOSE BLD GHB EST-MCNC: 160 MG/DL (ref 68–126)
FASTING STATUS PATIENT QL REPORTED: YES
GLOBULIN PLAS-MCNC: 3.7 G/DL (ref 2–3.5)
GLUCOSE BLD-MCNC: 150 MG/DL (ref 70–99)
GLUCOSE UR-MCNC: >1000 MG/DL
HBA1C MFR BLD: 7.2 % (ref ?–5.7)
HCT VFR BLD AUTO: 51.6 %
HGB BLD-MCNC: 16.7 G/DL
HGB UR QL STRIP.AUTO: NEGATIVE
HYALINE CASTS #/AREA URNS AUTO: PRESENT /LPF
IMM GRANULOCYTES # BLD AUTO: 0.02 X10(3) UL (ref 0–1)
IMM GRANULOCYTES NFR BLD: 0.2 %
KETONES UR-MCNC: NEGATIVE MG/DL
LEUKOCYTE ESTERASE UR QL STRIP.AUTO: 250
LYMPHOCYTES # BLD AUTO: 2.57 X10(3) UL (ref 1–4)
LYMPHOCYTES NFR BLD AUTO: 28.8 %
MCH RBC QN AUTO: 28.4 PG (ref 26–34)
MCHC RBC AUTO-ENTMCNC: 32.4 G/DL (ref 31–37)
MCV RBC AUTO: 87.6 FL
MONOCYTES # BLD AUTO: 0.71 X10(3) UL (ref 0.1–1)
MONOCYTES NFR BLD AUTO: 8 %
NEUTROPHILS # BLD AUTO: 5 X10 (3) UL (ref 1.5–7.7)
NEUTROPHILS # BLD AUTO: 5 X10(3) UL (ref 1.5–7.7)
NEUTROPHILS NFR BLD AUTO: 55.9 %
NITRITE UR QL STRIP.AUTO: NEGATIVE
OSMOLALITY SERPL CALC.SUM OF ELEC: 294 MOSM/KG (ref 275–295)
PH UR: 5.5 [PH] (ref 5–8)
PLATELET # BLD AUTO: 266 10(3)UL (ref 150–450)
POTASSIUM SERPL-SCNC: 4.4 MMOL/L (ref 3.5–5.1)
PROT SERPL-MCNC: 8.5 G/DL (ref 5.7–8.2)
Q-T INTERVAL: 346 MS
QRS DURATION: 78 MS
QTC CALCULATION (BEZET): 437 MS
R AXIS: -80 DEGREES
RBC # BLD AUTO: 5.89 X10(6)UL
SODIUM SERPL-SCNC: 139 MMOL/L (ref 136–145)
SP GR UR STRIP: 1.02 (ref 1–1.03)
T AXIS: 77 DEGREES
TROPONIN I SERPL HS-MCNC: 4 NG/L
UROBILINOGEN UR STRIP-ACNC: NORMAL
VENTRICULAR RATE: 96 BPM
WBC # BLD AUTO: 8.9 X10(3) UL (ref 4–11)

## 2024-08-19 PROCEDURE — 93005 ELECTROCARDIOGRAM TRACING: CPT

## 2024-08-19 PROCEDURE — 87086 URINE CULTURE/COLONY COUNT: CPT

## 2024-08-19 PROCEDURE — 83880 ASSAY OF NATRIURETIC PEPTIDE: CPT

## 2024-08-19 PROCEDURE — 85025 COMPLETE CBC W/AUTO DIFF WBC: CPT

## 2024-08-19 PROCEDURE — 84484 ASSAY OF TROPONIN QUANT: CPT

## 2024-08-19 PROCEDURE — 87077 CULTURE AEROBIC IDENTIFY: CPT

## 2024-08-19 PROCEDURE — 80053 COMPREHEN METABOLIC PANEL: CPT

## 2024-08-19 PROCEDURE — 83036 HEMOGLOBIN GLYCOSYLATED A1C: CPT

## 2024-08-19 PROCEDURE — 36415 COLL VENOUS BLD VENIPUNCTURE: CPT

## 2024-08-19 PROCEDURE — 81001 URINALYSIS AUTO W/SCOPE: CPT

## 2024-08-19 PROCEDURE — 93010 ELECTROCARDIOGRAM REPORT: CPT | Performed by: INTERNAL MEDICINE

## 2024-08-19 RX ORDER — NEBIVOLOL 10 MG/1
5 TABLET ORAL DAILY
Qty: 90 TABLET | Refills: 0 | Status: SHIPPED | OUTPATIENT
Start: 2024-08-19

## 2024-08-21 DIAGNOSIS — N39.0 URINARY TRACT INFECTION WITHOUT HEMATURIA, SITE UNSPECIFIED: Primary | ICD-10-CM

## 2024-08-21 RX ORDER — CEPHALEXIN 500 MG/1
500 CAPSULE ORAL 2 TIMES DAILY
Qty: 14 CAPSULE | Refills: 0 | Status: SHIPPED | OUTPATIENT
Start: 2024-08-21 | End: 2024-08-28

## 2024-08-22 NOTE — PROGRESS NOTES
1. Urinary tract infection without hematuria, site unspecified  Questionable UA with culture sent.  Culture positive for group beta strep  Start cephalexin 500 mg bid x 7days   - cephalexin 500 MG Oral Cap; Take 1 capsule (500 mg total) by mouth 2 (two) times daily for 7 days.  Dispense: 14 capsule; Refill: 0    LOBITO Mcclure

## 2024-08-29 ENCOUNTER — TELEPHONE (OUTPATIENT)
Dept: FAMILY MEDICINE CLINIC | Facility: CLINIC | Age: 69
End: 2024-08-29

## 2024-08-29 NOTE — TELEPHONE ENCOUNTER
He was called by Mirian about my message not being read under his urine culture. If he has taken all of keflex bid x 7 days no further action needed. Thanks

## 2024-08-29 NOTE — TELEPHONE ENCOUNTER
Patient states he received a call from a nurse about an hour ago that he needed to take more of the antibiotic Keflex. Patient is on last day of antibiotics, not seen in chart that he needs to take more. Please advise.

## 2024-09-10 ENCOUNTER — LAB ENCOUNTER (OUTPATIENT)
Dept: LAB | Facility: HOSPITAL | Age: 69
End: 2024-09-10
Attending: INTERNAL MEDICINE
Payer: MEDICARE

## 2024-09-10 DIAGNOSIS — Z01.818 PREOP EXAMINATION: Primary | ICD-10-CM

## 2024-09-10 LAB
ANION GAP SERPL CALC-SCNC: 10 MMOL/L (ref 0–18)
BASOPHILS # BLD AUTO: 0.06 X10(3) UL (ref 0–0.2)
BASOPHILS NFR BLD AUTO: 0.7 %
BUN BLD-MCNC: 25 MG/DL (ref 9–23)
BUN/CREAT SERPL: 20.3 (ref 10–20)
CALCIUM BLD-MCNC: 9.7 MG/DL (ref 8.7–10.4)
CHLORIDE SERPL-SCNC: 106 MMOL/L (ref 98–112)
CO2 SERPL-SCNC: 24 MMOL/L (ref 21–32)
CREAT BLD-MCNC: 1.23 MG/DL
DEPRECATED RDW RBC AUTO: 47.9 FL (ref 35.1–46.3)
EGFRCR SERPLBLD CKD-EPI 2021: 64 ML/MIN/1.73M2 (ref 60–?)
EOSINOPHIL # BLD AUTO: 0.53 X10(3) UL (ref 0–0.7)
EOSINOPHIL NFR BLD AUTO: 5.8 %
ERYTHROCYTE [DISTWIDTH] IN BLOOD BY AUTOMATED COUNT: 15 % (ref 11–15)
FASTING STATUS PATIENT QL REPORTED: YES
GLUCOSE BLD-MCNC: 134 MG/DL (ref 70–99)
HCT VFR BLD AUTO: 47.8 %
HGB BLD-MCNC: 15.4 G/DL
IMM GRANULOCYTES # BLD AUTO: 0.02 X10(3) UL (ref 0–1)
IMM GRANULOCYTES NFR BLD: 0.2 %
LYMPHOCYTES # BLD AUTO: 2.45 X10(3) UL (ref 1–4)
LYMPHOCYTES NFR BLD AUTO: 26.9 %
MCH RBC QN AUTO: 27.9 PG (ref 26–34)
MCHC RBC AUTO-ENTMCNC: 32.2 G/DL (ref 31–37)
MCV RBC AUTO: 86.8 FL
MONOCYTES # BLD AUTO: 0.71 X10(3) UL (ref 0.1–1)
MONOCYTES NFR BLD AUTO: 7.8 %
NEUTROPHILS # BLD AUTO: 5.34 X10 (3) UL (ref 1.5–7.7)
NEUTROPHILS # BLD AUTO: 5.34 X10(3) UL (ref 1.5–7.7)
NEUTROPHILS NFR BLD AUTO: 58.6 %
OSMOLALITY SERPL CALC.SUM OF ELEC: 296 MOSM/KG (ref 275–295)
PLATELET # BLD AUTO: 311 10(3)UL (ref 150–450)
POTASSIUM SERPL-SCNC: 4.5 MMOL/L (ref 3.5–5.1)
RBC # BLD AUTO: 5.51 X10(6)UL
SODIUM SERPL-SCNC: 140 MMOL/L (ref 136–145)
WBC # BLD AUTO: 9.1 X10(3) UL (ref 4–11)

## 2024-09-10 PROCEDURE — 85025 COMPLETE CBC W/AUTO DIFF WBC: CPT

## 2024-09-10 PROCEDURE — 36415 COLL VENOUS BLD VENIPUNCTURE: CPT

## 2024-09-10 PROCEDURE — 80048 BASIC METABOLIC PNL TOTAL CA: CPT

## 2024-09-16 ENCOUNTER — MED REC SCAN ONLY (OUTPATIENT)
Dept: FAMILY MEDICINE CLINIC | Facility: CLINIC | Age: 69
End: 2024-09-16

## 2024-09-25 RX ORDER — DABIGATRAN ETEXILATE 150 MG/1
150 CAPSULE ORAL 2 TIMES DAILY
COMMUNITY

## 2024-10-02 ENCOUNTER — HOSPITAL ENCOUNTER (OUTPATIENT)
Dept: INTERVENTIONAL RADIOLOGY/VASCULAR | Facility: HOSPITAL | Age: 69
Discharge: HOME | End: 2024-10-02
Attending: INTERNAL MEDICINE | Admitting: INTERNAL MEDICINE
Payer: MEDICARE

## 2024-10-02 VITALS
WEIGHT: 282 LBS | DIASTOLIC BLOOD PRESSURE: 74 MMHG | RESPIRATION RATE: 24 BRPM | OXYGEN SATURATION: 97 % | BODY MASS INDEX: 36.19 KG/M2 | HEART RATE: 54 BPM | TEMPERATURE: 97 F | SYSTOLIC BLOOD PRESSURE: 125 MMHG | HEIGHT: 74 IN

## 2024-10-02 DIAGNOSIS — R94.39 ABNORMAL STRESS TEST: ICD-10-CM

## 2024-10-02 LAB — GLUCOSE BLDC GLUCOMTR-MCNC: 186 MG/DL (ref 70–99)

## 2024-10-02 PROCEDURE — B2151ZZ FLUOROSCOPY OF LEFT HEART USING LOW OSMOLAR CONTRAST: ICD-10-PCS | Performed by: INTERNAL MEDICINE

## 2024-10-02 PROCEDURE — B2111ZZ FLUOROSCOPY OF MULTIPLE CORONARY ARTERIES USING LOW OSMOLAR CONTRAST: ICD-10-PCS | Performed by: INTERNAL MEDICINE

## 2024-10-02 PROCEDURE — 99152 MOD SED SAME PHYS/QHP 5/>YRS: CPT | Performed by: INTERNAL MEDICINE

## 2024-10-02 PROCEDURE — 82962 GLUCOSE BLOOD TEST: CPT

## 2024-10-02 PROCEDURE — 93458 L HRT ARTERY/VENTRICLE ANGIO: CPT | Performed by: INTERNAL MEDICINE

## 2024-10-02 PROCEDURE — 99153 MOD SED SAME PHYS/QHP EA: CPT | Performed by: INTERNAL MEDICINE

## 2024-10-02 PROCEDURE — 4A023N7 MEASUREMENT OF CARDIAC SAMPLING AND PRESSURE, LEFT HEART, PERCUTANEOUS APPROACH: ICD-10-PCS | Performed by: INTERNAL MEDICINE

## 2024-10-02 RX ORDER — VERAPAMIL HYDROCHLORIDE 2.5 MG/ML
INJECTION, SOLUTION INTRAVENOUS
Status: COMPLETED
Start: 2024-10-02 | End: 2024-10-02

## 2024-10-02 RX ORDER — ASPIRIN 81 MG/1
324 TABLET, CHEWABLE ORAL ONCE
Status: COMPLETED | OUTPATIENT
Start: 2024-10-02 | End: 2024-10-02

## 2024-10-02 RX ORDER — DIPHENHYDRAMINE HYDROCHLORIDE 50 MG/ML
INJECTION INTRAMUSCULAR; INTRAVENOUS
Status: COMPLETED
Start: 2024-10-02 | End: 2024-10-02

## 2024-10-02 RX ORDER — CLOPIDOGREL BISULFATE 75 MG/1
TABLET ORAL
Status: COMPLETED
Start: 2024-10-02 | End: 2024-10-02

## 2024-10-02 RX ORDER — MIDAZOLAM HYDROCHLORIDE 1 MG/ML
INJECTION INTRAMUSCULAR; INTRAVENOUS
Status: COMPLETED
Start: 2024-10-02 | End: 2024-10-02

## 2024-10-02 RX ORDER — HEPARIN SODIUM 1000 [USP'U]/ML
INJECTION, SOLUTION INTRAVENOUS; SUBCUTANEOUS
Status: COMPLETED
Start: 2024-10-02 | End: 2024-10-02

## 2024-10-02 RX ORDER — LIDOCAINE HYDROCHLORIDE 20 MG/ML
INJECTION, SOLUTION EPIDURAL; INFILTRATION; INTRACAUDAL; PERINEURAL
Status: COMPLETED
Start: 2024-10-02 | End: 2024-10-02

## 2024-10-02 RX ORDER — IOPAMIDOL 612 MG/ML
80 INJECTION, SOLUTION INTRAVASCULAR
Status: COMPLETED | OUTPATIENT
Start: 2024-10-02 | End: 2024-10-02

## 2024-10-02 RX ORDER — SODIUM CHLORIDE 9 MG/ML
3 INJECTION, SOLUTION INTRAVENOUS
Status: COMPLETED | OUTPATIENT
Start: 2024-10-02 | End: 2024-10-02

## 2024-10-02 RX ORDER — ASPIRIN 81 MG/1
TABLET, CHEWABLE ORAL
Status: DISCONTINUED
Start: 2024-10-02 | End: 2024-10-02

## 2024-10-02 RX ORDER — NITROGLYCERIN 20 MG/100ML
INJECTION INTRAVENOUS
Status: COMPLETED
Start: 2024-10-02 | End: 2024-10-02

## 2024-10-02 RX ADMIN — SODIUM CHLORIDE 3 ML/KG/HR: 9 INJECTION, SOLUTION INTRAVENOUS at 07:15:00

## 2024-10-02 RX ADMIN — ASPIRIN 324 MG: 81 TABLET, CHEWABLE ORAL at 07:32:00

## 2024-10-02 RX ADMIN — IOPAMIDOL 80 ML: 612 INJECTION, SOLUTION INTRAVASCULAR at 09:07:00

## 2024-10-02 RX ADMIN — CLOPIDOGREL BISULFATE 75 MG: 75 TABLET ORAL at 07:41:00

## 2024-10-02 NOTE — INTERVAL H&P NOTE
Pre-op Diagnosis: * No pre-op diagnosis entered *    The above referenced H&P was reviewed by Martinez Wilkes MD on 10/2/2024, the patient was examined and no significant changes have occurred in the patient's condition since the H&P was performed.  I discussed with the patient and/or legal representative the potential benefits, risks and side effects of this procedure; the likelihood of the patient achieving goals; and potential problems that might occur during recuperation.  I discussed reasonable alternatives to the procedure, including risks, benefits and side effects related to the alternatives and risks related to not receiving this procedure.  We will proceed with procedure as planned.

## 2024-10-02 NOTE — PROCEDURES
[facility]  [facility] Cardiac Cath Procedure Note  Tan Beasley Patient Status:  Outpatient    10/3/1955 MRN [id number]   Location [facility] INTERVENTIONAL SUITES Attending Martinez Wilkes MD   Hosp Day # 0 PCP DANIS CORNEJO MD       Cardiologist: Martinez Wilkes MD  Primary Proceduralist: Martinez Wilkes MD  Procedure Performed: [facility]  Date of Procedure: 10/2/2024   Indication: Abnormal stress test    Summary of findings: Mild nonobstructive CAD      Left Ventriculography and hemodynamics: LVEDP 20 mmHg, LVEF 55%      Coronary Angiography  RCA:  Dominant and free of obstructive disease, supplies PDA and PL  Left main:  Patent, free of obstructive disease  Left anterior descending:  Patent and free of obstructive disease, supplies 2 diagonals first diagonal ostial 80% stenosis small vessel  Circumflex: 20 to 30% mid stenosis, supplies 2 OM branches which are patent      Assessment:  Mild nonobstructive CAD      Recommendations:  Medical management      Summary of Case: After written informed consent was obtained from the patient, patient was brought to the cardiac catheterization laboratory.  Patient was prepped and draped in the usual sterile fashion. Lidocaine 1% was used to infiltrate the right radial artery for local anesthesia and a 6 Georgian introducer sheath was inserted into the right radial artery.      Selective coronary angiography performed with 6French JL 4 and JR4 catheters.  Angiography performed in standard projections.      Pigtail catheter was placed for LV hemodynamics and LV angiogram.    Specimen sent to: No specimen collected  Estimated blood loss: 10 cc  Closure:  TR band      IV was maintained by RN and moderate conscious sedation of versed and fentanyl was given.  Patient was assessed and monitoring of oxygen, heart rate and blood pressure by nurse and myself during the exam 37 minutes.      Martinez Wilkes MD  10/02/24

## 2024-10-02 NOTE — IVS NOTE
DISCHARGE NOTE     Pt is able to sit up and ambulate without difficulty.   Pt voided and tolerated fluids and food.   Procedural site remains dry and intact with good circulation, motion, and sensation.   No signs and symptoms of bleeding/hematoma noted.   R arm with armboard and sling in place   IV access removed  Instruction provided, patient/family verbalizes understanding.   Dr. Wilkes spoke with patient/family post procedure.     Pt discharge via wheelchair to [facility]     Follow up Appointment: 10/14 10:40 with Dr. Wilkes     New Prescription: n/a

## 2024-10-02 NOTE — DISCHARGE INSTRUCTIONS
Transradial Angiogram Discharge Instructions    The following instructions are for patient who have had an angiogram, cardiac cath, angioplasty, or stent through the radial artery.    Proper skin puncture site care is important during the healing period. This guideline should help to remind you of the verbal instructions you received from your physician or nurse.    Site: right wrist    Site Care:  Leave bandage in place for 24 hours. Then, gently wash with soap and water. Do no put any other bandage, ointment, powders, or creams to the site.  For 5 days after the procedure, make sure the wrist is not submerged in water or any liquid.  For local swelling: apply ice  If bleeding occurs, elevate the hand above the heart and apply local pressure    Activity/Driving:  Avoid wrist flexion, extension, and fine motor activities (i.e. Texting, typing, using a computer mouse, etc.) for 24 hours.  Do not lift push or pull anything heavier than 10 pounds with affected hand for 1 week.  Do not drive for 24 hours.  Do not drink alcohol for 24 hours  Do not make any critical decisions or sign any legal documents for the next 24 hours    Additional Instructions:  Do not take glucophage/metformin containing products for at least 48 hours after procedure, unless otherwise directed by your physician.    When you should NOTIFY YOUR PHYSICIAN  Call right away if you experience any of the following:    Swelling, pain, or bleeding at the site that is not relieved by applying ice or pressure  Signs of infection: redness, warmth, drainage at the site, chills, or temperature of 100.5 degrees or greater.  Changes in sensation, numbness, or tingling of affected hand.    Bleeding can occur at the procedure site - both on the outside of the skin and/or beneath the surface of the skin  Swelling or a large lump at the procedure site can occur, which may be accompanied by moderate to severe pain    If either of the above occurs, apply pressure to  the procedure site with 2-3 fingers, as instructed by the nurse.  Hold pressure for 20 minutes and the bleeding should stop.  Notify your physician of the occurrence  If the bleeding does not stop, call 911 and continue to apply pressure    If you experience signs of a fever, temperature > 101°, chills, infection (redness, swelling, thick yellow drainage, or a foul odor from the procedure site)  If you notice any numbness, tingling, or loss of feeling to your fingers or hand, if wrist access was utilized      Other  You may resume your present diet, unless otherwise specified by your physician.  You may resume all of your medications as prescribed, unless otherwise directed by your physician.  A list of your medications was provided to you at discharge.  Continue the walking program initiated in the hospital and progress your walking as directed.  Or, gradually resume your previous aerobic exercise schedule as tolerated.

## 2024-10-15 ENCOUNTER — OFFICE VISIT (OUTPATIENT)
Dept: FAMILY MEDICINE CLINIC | Facility: CLINIC | Age: 69
End: 2024-10-15

## 2024-10-15 VITALS
HEIGHT: 74 IN | DIASTOLIC BLOOD PRESSURE: 75 MMHG | BODY MASS INDEX: 36.63 KG/M2 | SYSTOLIC BLOOD PRESSURE: 115 MMHG | HEART RATE: 53 BPM | WEIGHT: 285.38 LBS

## 2024-10-15 DIAGNOSIS — I48.91 ATRIAL FIBRILLATION AND FLUTTER (HCC): ICD-10-CM

## 2024-10-15 DIAGNOSIS — Z23 NEED FOR INFLUENZA VACCINATION: ICD-10-CM

## 2024-10-15 DIAGNOSIS — I25.10 CORONARY ARTERY DISEASE INVOLVING NATIVE HEART WITHOUT ANGINA PECTORIS, UNSPECIFIED VESSEL OR LESION TYPE: ICD-10-CM

## 2024-10-15 DIAGNOSIS — I48.92 ATRIAL FIBRILLATION AND FLUTTER (HCC): ICD-10-CM

## 2024-10-15 DIAGNOSIS — Z12.11 COLON CANCER SCREENING: ICD-10-CM

## 2024-10-15 DIAGNOSIS — E11.9 TYPE 2 DIABETES MELLITUS WITHOUT COMPLICATION, UNSPECIFIED WHETHER LONG TERM INSULIN USE (HCC): Primary | ICD-10-CM

## 2024-10-15 PROCEDURE — 3078F DIAST BP <80 MM HG: CPT | Performed by: FAMILY MEDICINE

## 2024-10-15 PROCEDURE — G0008 ADMIN INFLUENZA VIRUS VAC: HCPCS | Performed by: FAMILY MEDICINE

## 2024-10-15 PROCEDURE — 90662 IIV NO PRSV INCREASED AG IM: CPT | Performed by: FAMILY MEDICINE

## 2024-10-15 PROCEDURE — 1126F AMNT PAIN NOTED NONE PRSNT: CPT | Performed by: FAMILY MEDICINE

## 2024-10-15 PROCEDURE — 3074F SYST BP LT 130 MM HG: CPT | Performed by: FAMILY MEDICINE

## 2024-10-15 PROCEDURE — 3008F BODY MASS INDEX DOCD: CPT | Performed by: FAMILY MEDICINE

## 2024-10-15 PROCEDURE — 1159F MED LIST DOCD IN RCRD: CPT | Performed by: FAMILY MEDICINE

## 2024-10-15 PROCEDURE — 99214 OFFICE O/P EST MOD 30 MIN: CPT | Performed by: FAMILY MEDICINE

## 2024-10-15 RX ORDER — ROSUVASTATIN CALCIUM 40 MG/1
40 TABLET, COATED ORAL NIGHTLY
Qty: 90 TABLET | Refills: 3 | Status: SHIPPED | OUTPATIENT
Start: 2024-10-15

## 2024-10-15 NOTE — PROGRESS NOTES
10/15/2024  9:06 AM    Tan Beasley is a 69 year old male.    Chief complaint(s):   Chief Complaint   Patient presents with    Follow - Up     Cardiology stopped amlodipine     Medication Request     Ozempic recommended by Cardiologist      HPI:     Tna Beasley primary complaint is regarding multiple complaints.     Patient Tan Beasley is a 69 year old male is here to be evaluated for type 2 diabetes.  Specifically, male has type 2, insulin requiring diabetes. Compliance with treatment has been fair.  Patient's diabetes was first diagnosed 2019.  Patient follows a 2000 calorie ADA diet.  Patient report experiencing the following diabetes related symptoms; Negative for polyuria, Negative for polydipsia, Negative for blurred vision.  Depression symptoms include none.  Tobacco screen: none  smoker.  Current meds include : Requesting Ozempic  oral hypoglycemic include: Emplagliflozin-metformin 12.5-1000mg  insulin/injectable : - .  Hypoglycemia severity is not applicable.he reports home blood glucose readings have been < 120 (#s) and believes  having fair glucose control.  Most recent lab results include glycohemoglobin 7.2 %, microalbuminuria have been negative.  In regard to preventative care, his last ophthalmology exam was in > 12 months ago.  Opthalmic evaluation have shown + pathology.  Concurrent relative health problems include CAD.     Patient presents with CAD/ A fib.  He is here today for routine follow-up.  Tan Beasley has a prior history of a myocardial infarction and is currently on a see list.  his heart disease was first diagnosed July 2019.  Currently,his treatment regimen consists of daily 81 mg aspirin, Bystolic 10 mg, Lisinopril 20 mg, Xarelto 150 mg, Torsemide 20 mg, Simvastatin 20 mg, and Plavix 75 mg.  He report negative for chest pain, negative for palpitations, negative  For feeling depressed, negative for fatigueness and negative for dyspnea.  Tan Beasley is compliant with exercise and  taking medications as recommended and prescribed.  A cardiac cath was done 2 weeks ago.        HISTORY:  Past Medical History:    Arrhythmia    Congestive heart disease (HCC)    Diabetes (HCC)    Essential hypertension    High blood pressure    High cholesterol    Prostate infection      No past surgical history on file.   Family History   Problem Relation Age of Onset    Cancer Mother     Hypertension Sister     Lipids Sister       Social History:   Social History     Socioeconomic History    Marital status:    Tobacco Use    Smoking status: Former     Passive exposure: Never    Smokeless tobacco: Never   Vaping Use    Vaping status: Never Used   Substance and Sexual Activity    Alcohol use: Yes     Comment: rare    Drug use: Never        Immunizations:   Immunization History   Administered Date(s) Administered    Covid-19 Vaccine Pfizer 30 mcg/0.3 ml 02/11/2021, 03/05/2021, 01/03/2022    FLU VAC High Dose 65 YRS & Older PRSV Free (92102) 11/17/2023    Pneumococcal Conjugate PCV20 09/06/2022    Pneumovax 23 11/02/2021    TDAP 11/02/2021    Zoster Vaccine Recombinant Adjuvanted (Shingrix) 11/02/2021, 03/28/2024   Pended Date(s) Pended    High Dose Fluzone Influenza Vaccine, 65yr+ PF 0.5mL (82742) 10/15/2024       Medications (Active prior to today's visit):  Current Outpatient Medications   Medication Sig Dispense Refill    semaglutide 2 MG/3ML Subcutaneous Solution Pen-injector 0.25 mg Q week for 4 weeks, 0.5 mg Q week 9 Undefined 3    rosuvastatin 40 MG Oral Tab Take 1 tablet (40 mg total) by mouth nightly. 90 tablet 3    dabigatran 150 MG Oral Cap Take 1 capsule (150 mg total) by mouth 2 (two) times daily.      nebivolol (BYSTOLIC) 10 MG Oral Tab Take 0.5 tablets (5 mg total) by mouth daily. (Patient taking differently: Take 1 tablet (10 mg total) by mouth daily.) 90 tablet 0    SYNJARDY 12.5-1000 MG Oral Tab TAKE 1 TABLET BY MOUTH TWICE DAILY 180 tablet 3    Accu-Chek Softclix Lancets Does not apply Misc  Use  each 3    Glucose Blood (ACCU-CHEK ANDREW PLUS) In Vitro Strip 1 lancet  by Finger stick route 2 (two) times daily. 100 strip 2    lisinopril 20 MG Oral Tab Take 1 tablet (20 mg total) by mouth daily. 90 tablet 0    Omeprazole 40 MG Oral Capsule Delayed Release Take 1 capsule (40 mg total) by mouth daily. (Patient taking differently: Take 1 capsule (40 mg total) by mouth daily as needed.) 90 capsule 3    torsemide 20 MG Oral Tab Take 1 tablet (20 mg total) by mouth daily. 90 tablet 3    ergocalciferol 1.25 MG (80177 UT) Oral Cap Take 1 capsule (50,000 Units total) by mouth once a week. 12 capsule 3    naproxen 500 MG Oral Tab Take 1 tablet (500 mg total) by mouth 2 (two) times daily with meals. (Patient not taking: Reported on 10/15/2024) 60 tablet 1       Allergies:  Allergies[1]      ROS:   Review of Systems   Constitutional:  Negative for appetite change, fatigue and fever.   Respiratory:  Negative for shortness of breath.    Cardiovascular:  Negative for chest pain, palpitations and leg swelling.   Gastrointestinal:  Negative for abdominal pain and blood in stool.   Endocrine: Negative for polydipsia and polyuria.   Musculoskeletal:  Negative for myalgias.   Skin:  Negative for rash.   Neurological:  Negative for dizziness, weakness and headaches.       PHYSICAL EXAM:   VS: /75   Pulse 53   Ht 6' 2\" (1.88 m)   Wt 285 lb 6.4 oz (129.5 kg)   BMI 36.64 kg/m²     Physical Exam  Vitals reviewed.   Constitutional:       Appearance: Normal appearance. He is well-developed. He is obese.   HENT:      Head: Normocephalic.   Eyes:      General: No scleral icterus.     Conjunctiva/sclera: Conjunctivae normal.   Cardiovascular:      Rate and Rhythm: Normal rate and regular rhythm.      Heart sounds: Normal heart sounds.   Pulmonary:      Effort: Pulmonary effort is normal.      Breath sounds: Normal breath sounds.   Musculoskeletal:      Cervical back: Neck supple.   Lymphadenopathy:      Comments: LEs  no edema    Skin:     Findings: No rash.   Psychiatric:         Mood and Affect: Mood normal.         LABORATORY RESULTS:     EKG / Spirometry : -     Radiology: CATH LEFT HEART CATH W/INTERVENTION    Result Date: 10/2/2024  This exam has been completed. Please refer to Notes for the results to this procedure.       ASSESSMENT/PLAN:   Assessment   Encounter Diagnoses   Name Primary?    Type 2 diabetes mellitus without complication, unspecified whether long term insulin use (HCC) Yes    Coronary artery disease involving native heart without angina pectoris, unspecified vessel or lesion type     Atrial fibrillation and flutter (HCC)     Colon cancer screening     Need for influenza vaccination        1. Type 2 diabetes mellitus without complication, unspecified whether long term insulin use (HCC)    DIABETES A&P    LABORATORY & ORDERS: Blood test(s) ordered today ;   Orders Placed This Encounter   Procedures    Microalb/Creat Ratio, Random Urine    Occult Blood, Fecal, FIT Immunoassay (Blue Cards)    Influenza, High-Dose (Fluzone) [92203]     Additional orders include: added Ozempic  MEDICATIONS:    rosuvastatin 40 MG Oral Tab, Take 1 tablet (40 mg total) by mouth nightly., Disp: 90 tablet, Rfl: 3    semaglutide 2 MG/3ML Subcutaneous Solution Pen-injector, 0.25 mg Q week for 4 weeks, 0.5 mg Q week, Disp: 3 each, Rfl: 1    dabigatran 150 MG Oral Cap, Take 1 capsule (150 mg total) by mouth 2 (two) times daily., Disp: , Rfl:     nebivolol (BYSTOLIC) 10 MG Oral Tab, Take 0.5 tablets (5 mg total) by mouth daily. (Patient taking differently: Take 1 tablet (10 mg total) by mouth daily.), Disp: 90 tablet, Rfl: 0    SYNJARDY 12.5-1000 MG Oral Tab, TAKE 1 TABLET BY MOUTH TWICE DAILY, Disp: 180 tablet, Rfl: 3    Accu-Chek Softclix Lancets Does not apply Misc, Use BID, Disp: 100 each, Rfl: 3    Glucose Blood (ACCU-CHEK ANDREW PLUS) In Vitro Strip, 1 lancet  by Finger stick route 2 (two) times daily., Disp: 100 strip, Rfl: 2     lisinopril 20 MG Oral Tab, Take 1 tablet (20 mg total) by mouth daily., Disp: 90 tablet, Rfl: 0    Omeprazole 40 MG Oral Capsule Delayed Release, Take 1 capsule (40 mg total) by mouth daily. (Patient taking differently: Take 1 capsule (40 mg total) by mouth daily as needed.), Disp: 90 capsule, Rfl: 3    torsemide 20 MG Oral Tab, Take 1 tablet (20 mg total) by mouth daily., Disp: 90 tablet, Rfl: 3    ergocalciferol 1.25 MG (57221 UT) Oral Cap, Take 1 capsule (50,000 Units total) by mouth once a week., Disp: 12 capsule, Rfl: 3    naproxen 500 MG Oral Tab, Take 1 tablet (500 mg total) by mouth 2 (two) times daily with meals. (Patient not taking: Reported on 10/15/2024), Disp: 60 tablet, Rfl: 1.  Requested Prescriptions     Signed Prescriptions Disp Refills    rosuvastatin 40 MG Oral Tab 90 tablet 3     Sig: Take 1 tablet (40 mg total) by mouth nightly.    semaglutide 2 MG/3ML Subcutaneous Solution Pen-injector 3 each 1     Si.25 mg Q week for 4 weeks, 0.5 mg Q week        REFERRALS:       Procedures    Microalb/Creat Ratio, Random Urine    Occult Blood, Fecal, FIT Immunoassay (Blue Cards)     RECOMMENDATIONS: instructed in use of glucometer ( check fasting glucose daily), return for training in administering insulin injections, adherence to an 1800 calorie ADA diet,  10 pound weight loss, a graduated exercise program, HgbA1C level checked quarterly, daily foot self-inspection, need for yearly flu shots, and avoid all sodas, juices, candy, chocolates, cakes, ice cream, etc.      FOLLOW-UP: Schedule a follow-up visit in 2 months.       COUNSELING: The patient was counseled concerning the relationship between diabetes control and macrovascular disease including cardiovascular, cerebrovascular and peripheral vascular disease. The patient was counseled concerning the relationship between diabetes control and retinopathy, nephropathy, and neuropathy. Advised as to the targets of pre-meal glucoses ( mg/dl) and post  meal glucoses (<140-160 mg/dl) Home glucose testing discussed. The A1c target of <7% according to ADA and <6.5% according to AACE were discussed.             2. Coronary artery disease involving native heart without angina pectoris, unspecified vessel or lesion type  3. Atrial fibrillation and flutter (HCC)    Doing well    MEDICATIONS:    rosuvastatin 40 MG Oral Tab, Take 1 tablet (40 mg total) by mouth nightly., Disp: 90 tablet, Rfl: 3    semaglutide 2 MG/3ML Subcutaneous Solution Pen-injector, 0.25 mg Q week for 4 weeks, 0.5 mg Q week, Disp: 3 each, Rfl: 1    dabigatran 150 MG Oral Cap, Take 1 capsule (150 mg total) by mouth 2 (two) times daily., Disp: , Rfl:     nebivolol (BYSTOLIC) 10 MG Oral Tab, Take 0.5 tablets (5 mg total) by mouth daily. (Patient taking differently: Take 1 tablet (10 mg total) by mouth daily.), Disp: 90 tablet, Rfl: 0    SYNJARDY 12.5-1000 MG Oral Tab, TAKE 1 TABLET BY MOUTH TWICE DAILY, Disp: 180 tablet, Rfl: 3    Accu-Chek Softclix Lancets Does not apply Misc, Use BID, Disp: 100 each, Rfl: 3    Glucose Blood (ACCU-CHEK ANDREW PLUS) In Vitro Strip, 1 lancet  by Finger stick route 2 (two) times daily., Disp: 100 strip, Rfl: 2    lisinopril 20 MG Oral Tab, Take 1 tablet (20 mg total) by mouth daily., Disp: 90 tablet, Rfl: 0    Omeprazole 40 MG Oral Capsule Delayed Release, Take 1 capsule (40 mg total) by mouth daily. (Patient taking differently: Take 1 capsule (40 mg total) by mouth daily as needed.), Disp: 90 capsule, Rfl: 3    torsemide 20 MG Oral Tab, Take 1 tablet (20 mg total) by mouth daily., Disp: 90 tablet, Rfl: 3    ergocalciferol 1.25 MG (88081 UT) Oral Cap, Take 1 capsule (50,000 Units total) by mouth once a week., Disp: 12 capsule, Rfl: 3    naproxen 500 MG Oral Tab, Take 1 tablet (500 mg total) by mouth 2 (two) times daily with meals. (Patient not taking: Reported on 10/15/2024), Disp: 60 tablet, Rfl: 1          LABORATORY & ORDERS:   Orders Placed This Encounter   Procedures     Microalb/Creat Ratio, Random Urine    Occult Blood, Fecal, FIT Immunoassay (Blue Cards)    Influenza, High-Dose (Fluzone) [[id number]]     REFERRALS: [facility] with Cardiology     RECOMMENDATIONS given include: Patient was reassured of  his medical condition and all questions and concerns were answered. Patient was informed to please, call our office with any new or further questions or concerns that may come up in the near future. Notify Dr Cornejo or the [facility] if there is a deterioration or worsening of the medical condition. Also, inform the doctor with any new symptoms or medications' side effects.      FOLLOW-UP: Schedule a follow-up visit in  [facility].            4. Colon cancer screening  Referral:   - Occult Blood, Fecal, FIT Immunoassay (Blue Cards); Future    5. Need for influenza vaccination  Given :   - Influenza, High-Dose (Fluzone) [[id number]]          Orders This Visit:  Orders Placed This Encounter   Procedures    Microalb/Creat Ratio, Random Urine    Occult Blood, Fecal, FIT Immunoassay (Blue Cards)    Influenza, High-Dose (Fluzone) [[id number]]       Meds This Visit:  Requested Prescriptions     Signed Prescriptions Disp Refills    semaglutide 2 MG/3ML Subcutaneous Solution Pen-injector 9 Undefined 3     Si.25 mg Q week for 4 weeks, 0.5 mg Q week    rosuvastatin 40 MG Oral Tab 90 tablet 3     Sig: Take 1 tablet (40 mg total) by mouth nightly.       Imaging & Referrals:  INFLUENZA VAC HIGH DOSE PRSV FREE         DANIS CORNEJO MD         [1] No Known Allergies

## 2024-10-18 ENCOUNTER — LAB ENCOUNTER (OUTPATIENT)
Dept: LAB | Age: 69
End: 2024-10-18
Attending: FAMILY MEDICINE
Payer: MEDICARE

## 2024-10-18 DIAGNOSIS — Z12.11 COLON CANCER SCREENING: ICD-10-CM

## 2024-10-18 DIAGNOSIS — E11.9 TYPE 2 DIABETES MELLITUS WITHOUT COMPLICATION, UNSPECIFIED WHETHER LONG TERM INSULIN USE (HCC): ICD-10-CM

## 2024-10-18 LAB
CREAT UR-SCNC: 99.1 MG/DL
HEMOCCULT STL QL: NEGATIVE
MICROALBUMIN UR-MCNC: 1.1 MG/DL
MICROALBUMIN/CREAT 24H UR-RTO: 11.1 UG/MG (ref ?–30)

## 2024-10-18 PROCEDURE — 82570 ASSAY OF URINE CREATININE: CPT

## 2024-10-18 PROCEDURE — 82274 ASSAY TEST FOR BLOOD FECAL: CPT

## 2024-10-18 PROCEDURE — 82043 UR ALBUMIN QUANTITATIVE: CPT

## 2024-11-14 ENCOUNTER — MED REC SCAN ONLY (OUTPATIENT)
Dept: FAMILY MEDICINE CLINIC | Facility: CLINIC | Age: 69
End: 2024-11-14

## 2024-11-14 ENCOUNTER — OFFICE VISIT (OUTPATIENT)
Dept: FAMILY MEDICINE CLINIC | Facility: CLINIC | Age: 69
End: 2024-11-14

## 2024-11-14 VITALS
SYSTOLIC BLOOD PRESSURE: 105 MMHG | HEIGHT: 74 IN | DIASTOLIC BLOOD PRESSURE: 68 MMHG | HEART RATE: 62 BPM | WEIGHT: 283.63 LBS | BODY MASS INDEX: 36.4 KG/M2

## 2024-11-14 DIAGNOSIS — E66.01 SEVERE OBESITY (BMI 35.0-39.9) WITH COMORBIDITY (HCC): Chronic | ICD-10-CM

## 2024-11-14 DIAGNOSIS — E11.9 TYPE 2 DIABETES MELLITUS WITHOUT COMPLICATION, UNSPECIFIED WHETHER LONG TERM INSULIN USE (HCC): Primary | ICD-10-CM

## 2024-11-14 LAB
GLUCOSE BLOOD: 120
TEST STRIP LOT #: NORMAL NUMERIC

## 2024-11-14 PROCEDURE — 3061F NEG MICROALBUMINURIA REV: CPT | Performed by: FAMILY MEDICINE

## 2024-11-14 PROCEDURE — 3008F BODY MASS INDEX DOCD: CPT | Performed by: FAMILY MEDICINE

## 2024-11-14 PROCEDURE — 99213 OFFICE O/P EST LOW 20 MIN: CPT | Performed by: FAMILY MEDICINE

## 2024-11-14 PROCEDURE — 82962 GLUCOSE BLOOD TEST: CPT | Performed by: FAMILY MEDICINE

## 2024-11-14 PROCEDURE — 3074F SYST BP LT 130 MM HG: CPT | Performed by: FAMILY MEDICINE

## 2024-11-14 PROCEDURE — 1126F AMNT PAIN NOTED NONE PRSNT: CPT | Performed by: FAMILY MEDICINE

## 2024-11-14 PROCEDURE — 3078F DIAST BP <80 MM HG: CPT | Performed by: FAMILY MEDICINE

## 2024-11-14 RX ORDER — DABIGATRAN ETEXILATE 150 MG/1
150 CAPSULE ORAL 2 TIMES DAILY
Qty: 180 CAPSULE | Refills: 1 | Status: SHIPPED | OUTPATIENT
Start: 2024-11-14

## 2024-11-14 RX ORDER — SIMVASTATIN 20 MG
20 TABLET ORAL NIGHTLY
COMMUNITY

## 2024-11-14 RX ORDER — OMEPRAZOLE 40 MG/1
40 CAPSULE, DELAYED RELEASE ORAL DAILY
Qty: 90 CAPSULE | Refills: 3 | Status: SHIPPED | OUTPATIENT
Start: 2024-11-14

## 2024-11-14 RX ORDER — AMLODIPINE BESYLATE 10 MG/1
10 TABLET ORAL DAILY
COMMUNITY

## 2024-11-14 NOTE — PROGRESS NOTES
11/14/2024  9:56 AM    Tan Beasley is a 69 year old male.    Chief complaint(s):   Chief Complaint   Patient presents with    Diabetes     F/u      HPI:     Tan Beasley primary complaint is regarding uncontrol diabetes.     Patient Tan Beasley is a 69 year old male is here to be evaluated for type 2 diabetes.  Specifically, male has type 2, insulin requiring diabetes. Compliance with treatment has been fair.  Patient's diabetes was first diagnosed 2019.  Patient follows a 2000 calorie ADA diet.  Patient report experiencing the following diabetes related symptoms; Negative for polyuria, Negative for polydipsia, Negative for blurred vision.  Depression symptoms include none.  Tobacco screen: none  smoker.  Current meds include :   oral hypoglycemic include: Emplagliflozin-metformin 12.5-1000mg  insulin/injectable :Ozempic 0.25 mg Qweek .  Hypoglycemia severity is not applicable.he reports home blood glucose readings have been < 120 (#s) and believes  having fair glucose control.  Most recent lab results include glycohemoglobin 7.2 %, microalbuminuria have been negative.  In regard to preventative care, his last ophthalmology exam was in > 12 months ago.  Opthalmic evaluation have shown + pathology.  Concurrent relative health problems include CAD.      Patient presents with CAD/ A fib.  He is here today for routine follow-up.  Tan Beasley has a prior history of a myocardial infarction and is currently on a see list.  his heart disease was first diagnosed July 2019.  Currently,his treatment regimen consists of daily 81 mg aspirin, Bystolic 10 mg, Lisinopril 20 mg, Xarelto 150 mg, Torsemide 20 mg, Simvastatin 20 mg, and Plavix 75 mg.  He report negative for chest pain, negative for palpitations, negative  For feeling depressed, negative for fatigueness and negative for dyspnea.  Tan Beasley is compliant with exercise and taking medications as recommended and prescribed.     HISTORY:  Past Medical History:     Arrhythmia    Congestive heart disease (HCC)    Diabetes (HCC)    Essential hypertension    High blood pressure    High cholesterol    Prostate infection      No past surgical history on file.   Family History   Problem Relation Age of Onset    Cancer Mother     Hypertension Sister     Lipids Sister       Social History:   Social History     Socioeconomic History    Marital status:    Tobacco Use    Smoking status: Former     Passive exposure: Never    Smokeless tobacco: Never   Vaping Use    Vaping status: Never Used   Substance and Sexual Activity    Alcohol use: Yes     Comment: rare    Drug use: Never        Immunizations:   Immunization History   Administered Date(s) Administered    Covid-19 Vaccine Pfizer 30 mcg/0.3 ml 02/11/2021, 03/05/2021, 01/03/2022    FLU VAC High Dose 65 YRS & Older PRSV Free (89266) 11/17/2023    High Dose Fluzone Influenza Vaccine, 65yr+ PF 0.5mL ([id number]) 10/15/2024    Pneumococcal Conjugate PCV20 09/06/2022    Pneumovax 23 11/02/2021    TDAP 11/02/2021    Zoster Vaccine Recombinant Adjuvanted (Shingrix) 11/02/2021, 03/28/2024       Medications (Active prior to today's visit):  Current Outpatient Medications   Medication Sig Dispense Refill    amLODIPine 10 MG Oral Tab Take 1 tablet (10 mg total) by mouth daily.      simvastatin 20 MG Oral Tab Take 1 tablet (20 mg total) by mouth nightly.      semaglutide 2 MG/3ML Subcutaneous Solution Pen-injector 0.5 mg Q week 3 each 1    dabigatran 150 MG Oral Cap Take 1 capsule (150 mg total) by mouth 2 (two) times daily. 180 capsule 1    Omeprazole 40 MG Oral Capsule Delayed Release Take 1 capsule (40 mg total) by mouth daily. 90 capsule 3    nebivolol (BYSTOLIC) 10 MG Oral Tab Take 0.5 tablets (5 mg total) by mouth daily. 90 tablet 0    SYNJARDY 12.5-1000 MG Oral Tab TAKE 1 TABLET BY MOUTH TWICE DAILY 180 tablet 3    Accu-Chek Softclix Lancets Does not apply Misc Use  each 3    Glucose Blood (ACCU-CHEK ANDREW PLUS) In Vitro Strip 1  lancet  by Finger stick route 2 (two) times daily. 100 strip 2    lisinopril 20 MG Oral Tab Take 1 tablet (20 mg total) by mouth daily. 90 tablet 0    naproxen 500 MG Oral Tab Take 1 tablet (500 mg total) by mouth 2 (two) times daily with meals. 60 tablet 1    torsemide 20 MG Oral Tab Take 1 tablet (20 mg total) by mouth daily. 90 tablet 3    ergocalciferol 1.25 MG (07315 UT) Oral Cap Take 1 capsule (50,000 Units total) by mouth once a week. 12 capsule 3       Allergies:  Allergies[1]      ROS:   Review of Systems   Constitutional:  Negative for appetite change, fatigue and fever.   Eyes:  Negative for visual disturbance.   Respiratory:  Negative for shortness of breath.    Cardiovascular:  Negative for chest pain.   Gastrointestinal:  Negative for abdominal pain.   Endocrine: Negative for polydipsia and polyuria.   Musculoskeletal:  Negative for myalgias.   Skin:  Negative for rash.   Neurological:  Negative for dizziness, weakness and headaches.       PHYSICAL EXAM:   VS: /68 (BP Location: Left arm, Patient Position: Sitting, Cuff Size: large)   Pulse 62   Ht 6' 2\" (1.88 m)   Wt 283 lb 9.6 oz (128.6 kg)   BMI 36.41 kg/m²     Physical Exam  Vitals reviewed.   Constitutional:       Appearance: Normal appearance. He is well-developed.   HENT:      Head: Normocephalic.   Eyes:      General: No scleral icterus.     Conjunctiva/sclera: Conjunctivae normal.   Cardiovascular:      Rate and Rhythm: Normal rate.   Pulmonary:      Effort: Pulmonary effort is normal.   Musculoskeletal:      Cervical back: Neck supple.   Skin:     Findings: No rash.   Psychiatric:         Mood and Affect: Mood normal.         LABORATORY RESULTS:   No results found for: \"URCOLOR\", \"URCLA\", \"URINELEUK\", \"URINENITRITE\", \"URINEBLOOD\"   Results for orders placed or performed in visit on 11/14/24   Glucose Blood Test    Collection Time: 11/14/24 10:20 AM   Result Value Ref Range    GLUCOSE BLOOD 120     Test Strip Lot # [id number] Numeric     Test Strip Expiration Date 25 Date       EKG / Spirometry : -     Radiology: No results found.     ASSESSMENT/PLAN:   Assessment   Encounter Diagnoses   Name Primary?    Type 2 diabetes mellitus without complication, unspecified whether long term insulin use (HCC) Yes    Severe obesity (BMI 35.0-39.9) with comorbidity (HCC)      Doing well  CPM    MEDICATIONS:   Refills  Requested Prescriptions     Signed Prescriptions Disp Refills    semaglutide 2 MG/3ML Subcutaneous Solution Pen-injector 3 each 1     Si.5 mg Q week    dabigatran 150 MG Oral Cap 180 capsule 1     Sig: Take 1 capsule (150 mg total) by mouth 2 (two) times daily.    Omeprazole 40 MG Oral Capsule Delayed Release 90 capsule 3     Sig: Take 1 capsule (40 mg total) by mouth daily.           LABORATORY & ORDERS:   Orders Placed This Encounter   Procedures    Glucose Blood Test   RECOMMENDATIONS given include: Patient was reassured of  his medical condition and all questions and concerns were answered. Patient was informed to please, call our office with any new or further questions or concerns that may come up in the near future. Notify Dr Cornejo or the [facility] if there is a deterioration or worsening of the medical condition. Also, inform the doctor with any new symptoms or medications' side effects.      FOLLOW-UP: Schedule a follow-up visit in  [facility].            Orders This Visit:  Orders Placed This Encounter   Procedures    Glucose Blood Test       Meds This Visit:  Requested Prescriptions     Signed Prescriptions Disp Refills    semaglutide 2 MG/3ML Subcutaneous Solution Pen-injector 3 each 1     Si.5 mg Q week    dabigatran 150 MG Oral Cap 180 capsule 1     Sig: Take 1 capsule (150 mg total) by mouth 2 (two) times daily.    Omeprazole 40 MG Oral Capsule Delayed Release 90 capsule 3     Sig: Take 1 capsule (40 mg total) by mouth daily.       Imaging & Referrals:  None         DANIS CORNEJO MD         [1] No Known  Allergies

## 2024-12-21 RX ORDER — NAPROXEN 500 MG/1
500 TABLET ORAL 2 TIMES DAILY WITH MEALS
Qty: 60 TABLET | Refills: 2 | Status: SHIPPED | OUTPATIENT
Start: 2024-12-21

## 2024-12-21 NOTE — TELEPHONE ENCOUNTER
Refill passed per [facility] protocol.     Requested Prescriptions   Pending Prescriptions Disp Refills    NAPROXEN 500 MG Oral Tab [Pharmacy Med Name: NAPROXEN 500MG TABLETS] 60 tablet 1     Sig: TAKE 1 TABLET(500 MG) BY MOUTH TWICE DAILY WITH MEALS       Non-Narcotic Pain Medication Protocol Passed - 12/21/2024  9:16 AM        Passed - In person appointment or virtual visit in the past 6 mos or appointment in next 3 mos     Recent Outpatient Visits              1 month ago Type 2 diabetes mellitus without complication, unspecified whether long term insulin use (HCC)    [facility]    [facility] Visit    2 months ago Type 2 diabetes mellitus without complication, unspecified whether long term insulin use (HCC)    [facility]    [facility]    4 months ago Heart failure, unspecified HF chronicity, unspecified heart failure type (HCC)    [facility]    Office Visit    9 months ago Medicare annual wellness visit, initial    [facility]    Office Visit    1 year ago Medicare annual wellness visit, initial    [facility]    [facility] Visit

## 2025-01-20 NOTE — TELEPHONE ENCOUNTER
Endocrinology: Follow up visit  Subjective   Patient ID: Anastacio Mcknight is a 49 y.o. female who presents for Hypothyroidism and Diabetes (Type 2 ).    PCP: Jeremy Cabral MD    HPI  I performed this visit using real-time telehealth tools, including an audio/video connection between the patient at their home and Dr Brenna Pinon [place], [place].    Dm2:  Ozempic 1 mg  Metformin 1000 every day    Since last visit increased ozempic to 1 mg  Reports sugars now in 80-low 100 range  Doing well with it  Weight down another 10 lbs  Last time adjusted t4 to x6 due to high levels  Feels fine.  Back at work at  again  Started on statin bp pcp and doing fine with it.    Review of Systems   Constitutional:  Negative for chills, fatigue and fever.   Respiratory:  Negative for cough and shortness of breath.    Cardiovascular:  Negative for chest pain and palpitations.   Gastrointestinal:  Negative for diarrhea, nausea and vomiting.   Neurological:  Negative for headaches.       Patient Active Problem List   Diagnosis    Abnormal blood chemistry    Abnormal ultrasound of uterus    Abnormal menstruation    Adult situational stress disorder    Amenorrhea    Breakthrough bleeding on birth control pills    Blood pressure elevated without history of HTN    Cyst of ovary, left    Delayed surgical wound healing    Functional dyspepsia    History of uterine fibroid    Graves' ophthalmopathy    Hyperlipidemia    Hypertension    Hypothyroidism    Iron deficiency anemia    Irregular menses    Irritable bowel syndrome with diarrhea    URI with cough and congestion    Obesity, Class II, BMI 35-39.9    Restless legs    S/P saul    Sleep disturbance    Status post breast reduction    Trigger finger of right thumb    Urinary frequency    Urinary symptom or sign    Female infertility    Vitamin D deficiency    Sinus congestion    Firmness of abdomen    Abnormality of right breast on screening mammogram    Abdominal pain     Wayne Snell I was not able to approve medication as I received a high alert. There is a drug interaction between Omeprazole and clopidogrel. Please review    Please review.  Protocol Failed or has no Protocol    Requested Prescriptions   Pending Prescriptions Disp Refills    OMEPRAZOLE 40 MG Oral Capsule Delayed Release [Pharmacy Med Name: OMEPRAZOLE 40MG CAPSULES] 90 capsule 1     Sig: TAKE 1 CAPSULE(40 MG) BY MOUTH DAILY       Gastrointestional Medication Protocol Passed - 11/27/2023  6:08 AM        Passed - In person appointment or virtual visit in the past 12 mos or appointment in next 3 mos     Recent Outpatient Visits              6 months ago Medicare annual wellness visit, initial    [address], [place], Tomas Iraheta MD    Office Visit    1 year ago Medicare annual wellness visit, initial    [address], [place], Tomas Iraheta MD    Office Visit    1 year ago Encounter for examination required by Department of Transportation (DOT)    [address], [address], [place], [employer]    Office Visit    2 years ago Anita Mancuso annual wellness visit, initial    [address], [place], Tomas Iraheta MD    Office Visit    2 years ago Type 2 diabetes mellitus without complication, without long-term current use of insulin [facility])    [address], [place], Tomas Iraheta MD    Office Visit "Abnormal uterine bleeding (AUB)        Home Meds:  Current Outpatient Medications   Medication Instructions    blood sugar diagnostic (OneTouch Verio test strips) strip 1 strip, miscellaneous, Daily    blood-glucose meter (OneTouch Verio Flex meter) misc 1 kit, miscellaneous, Daily    blood-glucose sensor (Dexcom G7 Sensor) device Change every 10 days    ergocalciferol (VITAMIN D-2) 50,000 Units, Once Weekly    fluticasone (Flonase) 50 mcg/actuation nasal spray 2 sprays, Each Nostril, Daily    fluticasone (Flonase) 50 mcg/actuation nasal spray 2 sprays, Daily    hydroCHLOROthiazide (HYDRODIURIL) 25 mg, oral, Daily    hydrOXYzine HCL (ATARAX) 25 mg, oral, Daily    ibuprofen 800 mg, Every 8 hours PRN    lancets (OneTouch Delica Plus Lancet) 33 gauge misc 1 Lancet, miscellaneous, Daily    levocetirizine (XYZAL) 5 mg, oral, Every evening    levothyroxine (SYNTHROID, LEVOXYL) 175 mcg, oral, Daily    metFORMIN (GLUCOPHAGE) 500 mg, oral, 2 times daily (morning and late afternoon)    norethindrone (AYGESTIN) 5 mg, oral, Daily    Ozempic 0.5 mg, subcutaneous, Every 7 days    Ozempic 1 mg, subcutaneous, Every 7 days    rosuvastatin (CRESTOR) 5 mg, oral, Daily        No Known Allergies     Objective   There were no vitals filed for this visit.   Vitals:    01/20/25 1602   Weight: 73 kg (161 lb)      Body mass index is 32.52 kg/m².     Labs:  Lab Results   Component Value Date    HGBA1C 8.8 (H) 09/18/2024    TSH 0.32 (L) 09/18/2024    FREET4 1.91 (H) 09/18/2024      No results found for: \"PR1\", \"THYROIDPAB\", \"TSI\"     Assessment/Plan   Assessment & Plan  Type 2 diabetes mellitus without complication, without long-term current use of insulin (Multi)  Due for A1c, by report should be great  Keep up efforts  Orders:    CBC; Future    Comprehensive Metabolic Panel; Future    Hemoglobin A1C; Future    Albumin-Creatinine Ratio, Urine Random; Future    Hyperlipidemia, unspecified hyperlipidemia type  Recheck lipids and adjust statin " as needed  Orders:    Lipid Panel; Future    Hypothyroidism, unspecified type  Discussed may need further adjust with weight change  Orders:    TSH with reflex to Free T4 if abnormal; Future        Electronically signed by:  Brenna Pinon MD 01/20/25 4:03 PM

## 2025-02-05 ENCOUNTER — TELEPHONE (OUTPATIENT)
Dept: FAMILY MEDICINE CLINIC | Facility: CLINIC | Age: 70
End: 2025-02-05

## 2025-02-05 ENCOUNTER — OFFICE VISIT (OUTPATIENT)
Dept: FAMILY MEDICINE CLINIC | Facility: CLINIC | Age: 70
End: 2025-02-05

## 2025-02-05 ENCOUNTER — LAB ENCOUNTER (OUTPATIENT)
Dept: LAB | Age: 70
End: 2025-02-05
Attending: FAMILY MEDICINE
Payer: MEDICARE

## 2025-02-05 VITALS
BODY MASS INDEX: 35.16 KG/M2 | SYSTOLIC BLOOD PRESSURE: 111 MMHG | WEIGHT: 274 LBS | HEIGHT: 74 IN | DIASTOLIC BLOOD PRESSURE: 74 MMHG | HEART RATE: 69 BPM

## 2025-02-05 DIAGNOSIS — E11.9 TYPE 2 DIABETES MELLITUS WITHOUT COMPLICATION, UNSPECIFIED WHETHER LONG TERM INSULIN USE (HCC): ICD-10-CM

## 2025-02-05 DIAGNOSIS — E11.9 TYPE 2 DIABETES MELLITUS WITHOUT COMPLICATION, UNSPECIFIED WHETHER LONG TERM INSULIN USE (HCC): Primary | ICD-10-CM

## 2025-02-05 LAB
CARTRIDGE EXPIRATION DATE: ABNORMAL DATE
CREAT UR-SCNC: 103.4 MG/DL
HEMOGLOBIN A1C: 6.2 % (ref 4.3–5.6)
MICROALBUMIN UR-MCNC: 0.8 MG/DL
MICROALBUMIN/CREAT 24H UR-RTO: 7.7 UG/MG (ref ?–30)

## 2025-02-05 PROCEDURE — 99214 OFFICE O/P EST MOD 30 MIN: CPT | Performed by: FAMILY MEDICINE

## 2025-02-05 PROCEDURE — 1126F AMNT PAIN NOTED NONE PRSNT: CPT | Performed by: FAMILY MEDICINE

## 2025-02-05 PROCEDURE — 3078F DIAST BP <80 MM HG: CPT | Performed by: FAMILY MEDICINE

## 2025-02-05 PROCEDURE — 1159F MED LIST DOCD IN RCRD: CPT | Performed by: FAMILY MEDICINE

## 2025-02-05 PROCEDURE — 3044F HG A1C LEVEL LT 7.0%: CPT | Performed by: FAMILY MEDICINE

## 2025-02-05 PROCEDURE — 3008F BODY MASS INDEX DOCD: CPT | Performed by: FAMILY MEDICINE

## 2025-02-05 PROCEDURE — 3074F SYST BP LT 130 MM HG: CPT | Performed by: FAMILY MEDICINE

## 2025-02-05 PROCEDURE — 82043 UR ALBUMIN QUANTITATIVE: CPT

## 2025-02-05 PROCEDURE — 82570 ASSAY OF URINE CREATININE: CPT

## 2025-02-05 PROCEDURE — 83036 HEMOGLOBIN GLYCOSYLATED A1C: CPT | Performed by: FAMILY MEDICINE

## 2025-02-05 RX ORDER — BLOOD-GLUCOSE METER
1 EACH MISCELLANEOUS 2 TIMES DAILY
Qty: 1 EACH | Refills: 0 | Status: SHIPPED | OUTPATIENT
Start: 2025-02-05

## 2025-02-05 RX ORDER — LANCETS
EACH MISCELLANEOUS
Qty: 200 EACH | Refills: 3 | OUTPATIENT
Start: 2025-02-05

## 2025-02-05 RX ORDER — BLOOD SUGAR DIAGNOSTIC
STRIP MISCELLANEOUS
Qty: 200 STRIP | Refills: 3 | OUTPATIENT
Start: 2025-02-05

## 2025-02-05 RX ORDER — BLOOD-GLUCOSE METER
1 EACH MISCELLANEOUS 2 TIMES DAILY
Qty: 100 EACH | Refills: 5 | Status: SHIPPED | OUTPATIENT
Start: 2025-02-05

## 2025-02-05 NOTE — PROGRESS NOTES
2/5/2025  9:00 AM    Tan Beasley is a 69 year old male.    Chief complaint(s):   Chief Complaint   Patient presents with    Diabetes     F/u requesting to increase his ozempic and new glucometer     HPI:     Tan Beasley primary complaint is regarding DM.     Patient Tan Beasley is a 69 year old male is here to be evaluated for type 2 diabetes.  Specifically, male has type 2, insulin requiring diabetes. Compliance with treatment has been fair.  Patient's diabetes was first diagnosed 2019.  Patient follows a 2000 calorie ADA diet.  Patient report experiencing the following diabetes related symptoms; Negative for polyuria, Negative for polydipsia, Negative for blurred vision.  Depression symptoms include none.  Tobacco screen: none  smoker.  Current meds include :   oral hypoglycemic include: Emplagliflozin-metformin 12.5-1000mg  insulin/injectable :Ozempic 1 mg Qweek .  Hypoglycemia severity is not applicable.he reports home blood glucose readings have been < 120 (#s) and believes  having fair glucose control.  Most recent lab results include glycohemoglobin 7.2 %, microalbuminuria have been negative.  In regard to preventative care, his last ophthalmology exam was in > 12 months ago.  Opthalmic evaluation have shown + pathology.  Concurrent relative health problems include CAD      HISTORY:  Past Medical History:    Arrhythmia    Congestive heart disease (HCC)    Diabetes (HCC)    Essential hypertension    High blood pressure    High cholesterol    Prostate infection      No past surgical history on file.   Family History   Problem Relation Age of Onset    Cancer Mother     Hypertension Sister     Lipids Sister       Social History:   Social History     Socioeconomic History    Marital status:    Tobacco Use    Smoking status: Former     Passive exposure: Never    Smokeless tobacco: Never   Vaping Use    Vaping status: Never Used   Substance and Sexual Activity    Alcohol use: Yes     Comment: rare     Drug use: Never        Immunizations:   Immunization History   Administered Date(s) Administered    Covid-19 Vaccine Pfizer 30 mcg/0.3 ml 02/11/2021, 03/05/2021, 01/03/2022    FLU VAC High Dose 65 YRS & Older PRSV Free (53738) 11/17/2023    High Dose Fluzone Influenza Vaccine, 65yr+ PF 0.5mL (06513) 10/15/2024    Pneumococcal Conjugate PCV20 09/06/2022    Pneumovax 23 11/02/2021    TDAP 11/02/2021    Zoster Vaccine Recombinant Adjuvanted (Shingrix) 11/02/2021, 03/28/2024       Medications (Active prior to today's visit):  Current Outpatient Medications   Medication Sig Dispense Refill    Blood Glucose Monitoring Suppl ([employer] AUTOMATIC BLOOD GLUCOSE) Does not apply Device 1 each  in the morning and 1 each before bedtime. 1 each 0    Glucose Blood Automatic ([employer] AUTOMATIC TEST CARTRIDGES) In Vitro Test 1 each by In Vitro route in the morning and 1 each before bedtime. 100 each 5    semaglutide 2 MG/3ML Subcutaneous Solution Pen-injector 1 mg Q week 3 each 1    naproxen 500 MG Oral Tab Take 1 tablet (500 mg total) by mouth 2 (two) times daily with meals. 60 tablet 2    amLODIPine 10 MG Oral Tab Take 1 tablet (10 mg total) by mouth daily.      simvastatin 20 MG Oral Tab Take 1 tablet (20 mg total) by mouth nightly.      dabigatran 150 MG Oral Cap Take 1 capsule (150 mg total) by mouth 2 (two) times daily. 180 capsule 1    Omeprazole 40 MG Oral Capsule Delayed Release Take 1 capsule (40 mg total) by mouth daily. 90 capsule 3    nebivolol (BYSTOLIC) 10 MG Oral Tab Take 0.5 tablets (5 mg total) by mouth daily. 90 tablet 0    SYNJARDY 12.5-1000 MG Oral Tab TAKE 1 TABLET BY MOUTH TWICE DAILY 180 tablet 3    Accu-Chek Softclix Lancets Does not apply Misc Use  each 3    Glucose Blood (ACCU-CHEK ANDREW PLUS) In Vitro Strip 1 lancet  by Finger stick route 2 (two) times daily. 100 strip 2    lisinopril 20 MG Oral Tab Take 1 tablet (20 mg total) by mouth daily. 90 tablet 0    torsemide 20 MG Oral Tab Take 1 tablet (20 mg  total) by mouth daily. 90 tablet 3    ergocalciferol 1.25 MG (71656 UT) Oral Cap Take 1 capsule (50,000 Units total) by mouth once a week. 12 capsule 3       Allergies:  Allergies[1]      ROS:   Review of Systems   Constitutional:  Negative for appetite change, fatigue and fever.   Respiratory:  Negative for shortness of breath.    Cardiovascular:  Negative for chest pain.   Gastrointestinal:  Negative for abdominal pain.   Endocrine: Negative for polydipsia and polyuria.   Musculoskeletal:  Negative for myalgias.   Skin:  Negative for rash.   Neurological:  Negative for dizziness, weakness and headaches.       PHYSICAL EXAM:   VS: /74 (BP Location: Right arm, Patient Position: Sitting, Cuff Size: adult)   Pulse 69   Ht 6' 2\" (1.88 m)   Wt 274 lb (124.3 kg)   BMI 35.18 kg/m²     Physical Exam  Vitals reviewed.   Constitutional:       Appearance: Normal appearance. He is well-developed.   HENT:      Head: Normocephalic.   Eyes:      General: No scleral icterus.     Conjunctiva/sclera: Conjunctivae normal.   Cardiovascular:      Rate and Rhythm: Normal rate.   Pulmonary:      Effort: Pulmonary effort is normal.   Musculoskeletal:      Cervical back: Neck supple.   Feet:      Right foot:      Protective Sensation: 10 sites tested.  10 sites sensed.      Left foot:      Protective Sensation: 10 sites tested.  10 sites sensed.   Skin:     Findings: No rash.   Psychiatric:         Mood and Affect: Mood normal.         LABORATORY RESULTS:   No results found for: \"URCOLOR\", \"URCLA\", \"URINELEUK\", \"URINENITRITE\", \"URINEBLOOD\"   Results for orders placed or performed in visit on 02/05/25   POC Glycohemoglobin [46990]    Collection Time: 02/05/25  9:12 AM   Result Value Ref Range    HEMOGLOBIN A1C 6.2 (A) 4.3 - 5.6 %    Cartridge Lot# [id number] Numeric    Cartridge Expiration Date 101,126 Date     EKG / Spirometry : -     Radiology: No results found.     ASSESSMENT/PLAN:   Assessment   Encounter Diagnosis   Name  Primary?    Type 2 diabetes mellitus without complication, unspecified whether long term insulin use (HCC) Yes       DIABETES A&P    Doing well, lost 9 lbs since Nov 2024.    LABORATORY & ORDERS: Blood test(s) ordered today ;   Orders Placed This Encounter   Procedures    POC Glycohemoglobin [01135]    Microalb/Creat Ratio, Random Urine     Additional orders include: increased Ozempic to 1 mg Q week  MEDICATIONS:    Blood Glucose Monitoring Suppl ([employer] AUTOMATIC BLOOD GLUCOSE) Does not apply Device, 1 each  in the morning and 1 each before bedtime., Disp: 1 each, Rfl: 0    Glucose Blood Automatic ([employer] AUTOMATIC TEST CARTRIDGES) In Vitro Test, 1 each by In Vitro route in the morning and 1 each before bedtime., Disp: 100 each, Rfl: 5    semaglutide 2 MG/3ML Subcutaneous Solution Pen-injector, 1 mg Q week, Disp: 3 each, Rfl: 1    naproxen 500 MG Oral Tab, Take 1 tablet (500 mg total) by mouth 2 (two) times daily with meals., Disp: 60 tablet, Rfl: 2    amLODIPine 10 MG Oral Tab, Take 1 tablet (10 mg total) by mouth daily., Disp: , Rfl:     simvastatin 20 MG Oral Tab, Take 1 tablet (20 mg total) by mouth nightly., Disp: , Rfl:     dabigatran 150 MG Oral Cap, Take 1 capsule (150 mg total) by mouth 2 (two) times daily., Disp: 180 capsule, Rfl: 1    Omeprazole 40 MG Oral Capsule Delayed Release, Take 1 capsule (40 mg total) by mouth daily., Disp: 90 capsule, Rfl: 3    nebivolol (BYSTOLIC) 10 MG Oral Tab, Take 0.5 tablets (5 mg total) by mouth daily., Disp: 90 tablet, Rfl: 0    SYNJARDY 12.5-1000 MG Oral Tab, TAKE 1 TABLET BY MOUTH TWICE DAILY, Disp: 180 tablet, Rfl: 3    Accu-Chek Softclix Lancets Does not apply Misc, Use BID, Disp: 100 each, Rfl: 3    Glucose Blood (ACCU-CHEK ANDREW PLUS) In Vitro Strip, 1 lancet  by Finger stick route 2 (two) times daily., Disp: 100 strip, Rfl: 2    lisinopril 20 MG Oral Tab, Take 1 tablet (20 mg total) by mouth daily., Disp: 90 tablet, Rfl: 0    torsemide 20 MG Oral Tab, Take 1 tablet  (20 mg total) by mouth daily., Disp: 90 tablet, Rfl: 3    ergocalciferol 1.25 MG (57079 UT) Oral Cap, Take 1 capsule (50,000 Units total) by mouth once a week., Disp: 12 capsule, Rfl: 3.  Requested Prescriptions     Signed Prescriptions Disp Refills    Blood Glucose Monitoring Suppl ([employer] AUTOMATIC BLOOD GLUCOSE) Does not apply Device 1 each 0     Si each  in the morning and 1 each before bedtime.    Glucose Blood Automatic ([employer] AUTOMATIC TEST CARTRIDGES) In Vitro Test 100 each 5     Si each by In Vitro route in the morning and 1 each before bedtime.    semaglutide 2 MG/3ML Subcutaneous Solution Pen-injector 3 each 1     Si mg Q week      REFERRALS:       Procedures    POC Glycohemoglobin [82138]    Microalb/Creat Ratio, Random Urine     RECOMMENDATIONS: instructed in use of glucometer ( check fasting glucose daily), return for training in administering insulin injections, adherence to an 1800 calorie ADA diet,  10 pound weight loss, a graduated exercise program, HgbA1C level checked quarterly, daily foot self-inspection, need for yearly flu shots, and avoid all sodas, juices, candy, chocolates, cakes, ice cream, etc.      FOLLOW-UP: Schedule a follow-up visit in 6 months.       COUNSELING: The patient was counseled concerning the relationship between diabetes control and macrovascular disease including cardiovascular, cerebrovascular and peripheral vascular disease. The patient was counseled concerning the relationship between diabetes control and retinopathy, nephropathy, and neuropathy. Advised as to the targets of pre-meal glucoses ( mg/dl) and post meal glucoses (<140-160 mg/dl) Home glucose testing discussed. The A1c target of <7% according to ADA and <6.5% according to AACE were discussed.                Orders This Visit:  Orders Placed This Encounter   Procedures    POC Glycohemoglobin [[id number]]    Microalb/Creat Ratio, Random Urine       Meds This Visit:  Requested Prescriptions     Signed  Prescriptions Disp Refills    Blood Glucose Monitoring Suppl ([employer] AUTOMATIC BLOOD GLUCOSE) Does not apply Device 1 each 0     Si each  in the morning and 1 each before bedtime.    Glucose Blood Automatic ([employer] AUTOMATIC TEST CARTRIDGES) In Vitro Test 100 each 5     Si each by In Vitro route in the morning and 1 each before bedtime.    semaglutide 2 MG/3ML Subcutaneous Solution Pen-injector 3 each 1     Si mg Q week       Imaging & Referrals:  None         DANIS CORNEJO MD         [1] No Known Allergies

## 2025-02-05 NOTE — TELEPHONE ENCOUNTER
Spoke to the pharmacist - Christina, verified patient's Name and . Relayed new prescription was sent. Prescription acknowledged and received.    Also received prescription for [employer] diabetic supplies which is not covered by patient's insurance. Requesting new prescription for Accu-Chek guide strips and soft click lancets.     Dr. Key please see pended prescription for review and approval.

## 2025-02-05 NOTE — TELEPHONE ENCOUNTER
[facility] pharmacy received script for the ozempic but the script is for the wrong package size.  If patient is injecting 1mg then needs the correct pen dose. New script needed.  Medication pended. Please advise.

## 2025-04-05 ENCOUNTER — TELEPHONE (OUTPATIENT)
Dept: FAMILY MEDICINE CLINIC | Facility: CLINIC | Age: 70
End: 2025-04-05

## 2025-04-05 NOTE — TELEPHONE ENCOUNTER
Patient calling ( name and date of birth of patient verified ) requesting refill for Ozempic    Patient would like to go up to next dose      Which GLP is the patient on: Ozempic   Current dose: 1mg   Patient seeking refill or increase to next dose: yes   How many doses of current dose completed: all doses   Side effects, if any:  None   Current weight / how much weight loss: 15 labs   Last visit: 2/5/2025        Please advise and thank you.

## 2025-04-07 NOTE — TELEPHONE ENCOUNTER
Patient called (identified name and ),   Given message from Dr Key,  Appointment scheduled.  Future Appointments   Date Time Provider Department Center   2025 11:45 AM Richard Key MD [facility]

## 2025-06-02 ENCOUNTER — OFFICE VISIT (OUTPATIENT)
Dept: FAMILY MEDICINE CLINIC | Facility: CLINIC | Age: 70
End: 2025-06-02

## 2025-06-02 VITALS
SYSTOLIC BLOOD PRESSURE: 110 MMHG | BODY MASS INDEX: 35 KG/M2 | HEART RATE: 65 BPM | DIASTOLIC BLOOD PRESSURE: 67 MMHG | WEIGHT: 270 LBS

## 2025-06-02 DIAGNOSIS — E66.01 SEVERE OBESITY (BMI 35.0-39.9) WITH COMORBIDITY (HCC): ICD-10-CM

## 2025-06-02 DIAGNOSIS — E11.9 TYPE 2 DIABETES MELLITUS WITHOUT COMPLICATION, UNSPECIFIED WHETHER LONG TERM INSULIN USE (HCC): Primary | ICD-10-CM

## 2025-06-02 DIAGNOSIS — M19.041 PRIMARY OSTEOARTHRITIS OF RIGHT HAND: ICD-10-CM

## 2025-06-02 PROCEDURE — 1125F AMNT PAIN NOTED PAIN PRSNT: CPT | Performed by: FAMILY MEDICINE

## 2025-06-02 PROCEDURE — 3078F DIAST BP <80 MM HG: CPT | Performed by: FAMILY MEDICINE

## 2025-06-02 PROCEDURE — 3074F SYST BP LT 130 MM HG: CPT | Performed by: FAMILY MEDICINE

## 2025-06-02 PROCEDURE — 3061F NEG MICROALBUMINURIA REV: CPT | Performed by: FAMILY MEDICINE

## 2025-06-02 PROCEDURE — 99214 OFFICE O/P EST MOD 30 MIN: CPT | Performed by: FAMILY MEDICINE

## 2025-06-02 PROCEDURE — 1159F MED LIST DOCD IN RCRD: CPT | Performed by: FAMILY MEDICINE

## 2025-06-02 RX ORDER — PREDNISONE 5 MG/1
TABLET ORAL
Qty: 35 TABLET | Refills: 0 | Status: SHIPPED | OUTPATIENT
Start: 2025-06-02

## 2025-06-02 NOTE — PROGRESS NOTES
6/2/2025  11:52 AM    Tan Beasley is a 69 year old male.    Chief complaint(s):   Chief Complaint   Patient presents with    Diabetes    Wrist Pain     Right wrist, trouble making a fist, swelling      HPI:     Tan Beasley primary complaint is regarding as above.     Patient Tan Beasley is a 69 year old male is here to be evaluated for type 2 diabetes.  Specifically, male has type 2, insulin requiring diabetes. Compliance with treatment has been fair.  Patient's diabetes was first diagnosed 2019.  Patient follows a 2000 calorie ADA diet.  Patient report experiencing the following diabetes related symptoms; Negative for polyuria, Negative for polydipsia, Negative for blurred vision.  Depression symptoms include none.  Tobacco screen: none  smoker.  Current meds include :   oral hypoglycemic include: Emplagliflozin-metformin 12.5-1000mg  insulin/injectable :Ozempic 1 mg Qweek .  Hypoglycemia severity is not applicable.he reports home blood glucose readings have been < 120 (#s) and believes  having fair glucose control.  Most recent lab results include glycohemoglobin 6.2 %, microalbuminuria have been negative.  In regard to preventative care, his last ophthalmology exam was in > 12 months ago.  Opthalmic evaluation have shown + pathology.  Concurrent relative health problems include CAD.     In addition he is complaining of right hand pain mostly at the metacarpophalangeal joints and by the wrist area.  This started a few weeks ago.  Denies any trauma accidents or injuries.      HISTORY:  Past Medical History[1]   Past Surgical History[2]   Family History[3]   Social History: Short Social Hx on File[4]     Immunizations:   Immunization History   Administered Date(s) Administered    Covid-19 Vaccine Pfizer 30 mcg/0.3 ml 02/11/2021, 03/05/2021, 01/03/2022    FLU VAC High Dose 65 YRS & Older PRSV Free (88792) 11/17/2023    High Dose Fluzone Influenza Vaccine, 65yr+ PF 0.5mL (26861) 10/15/2024    Pneumococcal  Conjugate PCV20 09/06/2022    Pneumovax 23 11/02/2021    TDAP 11/02/2021    Zoster Vaccine Recombinant Adjuvanted (Shingrix) 11/02/2021, 03/28/2024       Medications (Active prior to today's visit):  Current Medications[5]    Allergies:  Allergies[6]      ROS:   Review of Systems   Constitutional:  Positive for unexpected weight change. Negative for appetite change, fatigue and fever.   Respiratory:  Negative for shortness of breath.    Cardiovascular:  Negative for chest pain.   Gastrointestinal:  Negative for abdominal pain.   Endocrine: Negative for polydipsia and polyuria.   Musculoskeletal:  Positive for arthralgias. Negative for myalgias.   Skin:  Negative for rash.   Neurological:  Negative for dizziness, weakness and headaches.       PHYSICAL EXAM:   VS: /67 (BP Location: Right arm, Patient Position: Sitting, Cuff Size: large)   Pulse 65   Wt 270 lb (122.5 kg)   BMI 34.67 kg/m²     Physical Exam  Vitals reviewed.   Constitutional:       Appearance: Normal appearance. He is well-developed.   HENT:      Head: Normocephalic.   Eyes:      General: No scleral icterus.     Conjunctiva/sclera: Conjunctivae normal.   Cardiovascular:      Rate and Rhythm: Normal rate.   Pulmonary:      Effort: Pulmonary effort is normal.   Musculoskeletal:      Cervical back: Neck supple.      Comments: Right hand synovitis MPJs and wrist   Skin:     Findings: No rash.   Psychiatric:         Mood and Affect: Mood normal.         LABORATORY RESULTS:   No results found for: \"URCOLOR\", \"URCLA\", \"URINELEUK\", \"URINENITRITE\", \"URINEBLOOD\"   Results for orders placed or performed in visit on 02/05/25   Microalb/Creat Ratio, Random Urine    Collection Time: 02/05/25  9:46 AM   Result Value Ref Range    Microalbumin, Urine 0.80 mg/dL    Creatinine Ur Random 103.40 mg/dL    Malb/Cre Calc 7.7 <=30.0 ug/mg       EKG / Spirometry : -     Radiology: No results found.     ASSESSMENT/PLAN:   Assessment   Encounter Diagnoses   Name Primary?     Type 2 diabetes mellitus without complication, unspecified whether long term insulin use (HCC) Yes    Severe obesity (BMI 35.0-39.9) with comorbidity (HCC)     Primary osteoarthritis of right hand        1. Type 2 diabetes mellitus without complication, unspecified whether long term insulin use (HCC)    2. Severe obesity (BMI 35.0-39.9) with comorbidity (HCC)    DIABETES A&P    LABORATORY & ORDERS: Blood test(s) ordered today ; No orders of the defined types were placed in this encounter.    Additional orders include: increased Ozempic to 2 mg Q week  MEDICATIONS:  Current Medications[7].  Requested Prescriptions     Signed Prescriptions Disp Refills    diclofenac 1 % External Gel 60 g 2     Sig: Apply 4 g topically 4 (four) times daily. Apply to affected area(s).    predniSONE 5 MG Oral Tab 35 tablet 0     Si tab po Q day for 5 days, then 2 tab po Q day for 5 days , then 1 tab  po Q day for 5 days    semaglutide 4 MG/3ML Subcutaneous Solution Pen-injector 6 mL 3     Sig: Inject 2 mg into the skin once a week.      RECOMMENDATIONS: instructed in use of glucometer ( check fasting glucose daily), return for training in administering insulin injections, adherence to an 1800 calorie ADA diet,  10 pound weight loss, a graduated exercise program, HgbA1C level checked quarterly, daily foot self-inspection, need for yearly flu shots, and avoid all sodas, juices, candy, chocolates, cakes, ice cream, etc.      FOLLOW-UP: Schedule a follow-up visit in 4 months.       COUNSELING: The patient was counseled concerning the relationship between diabetes control and macrovascular disease including cardiovascular, cerebrovascular and peripheral vascular disease. The patient was counseled concerning the relationship between diabetes control and retinopathy, nephropathy, and neuropathy. Advised as to the targets of pre-meal glucoses ( mg/dl) and post meal glucoses (<140-160 mg/dl) Home glucose testing discussed. The A1c  target of <7% according to ADA and <6.5% according to AACE were discussed.           3. Primary osteoarthritis of right hand    MEDICATIONS:     Requested Prescriptions     Signed Prescriptions Disp Refills    diclofenac 1 % External Gel 60 g 2     Sig: Apply 4 g topically 4 (four) times daily. Apply to affected area(s).    predniSONE 5 MG Oral Tab 35 tablet 0     Si tab po Q day for 5 days, then 2 tab po Q day for 5 days , then 1 tab  po Q day for 5 days        RECOMMENDATIONS given include: Patient was reassured of  his medical condition and all questions and concerns were answered. Patient was informed to please, call our office with any new or further questions or concerns that may come up in the near future. Notify Dr Cornejo or the [facility] if there is a deterioration or worsening of the medical condition. Also, inform the doctor with any new symptoms or medications' side effects.      FOLLOW-UP: Schedule a follow-up visit in  prn/ [facility].             Orders This Visit:  No orders of the defined types were placed in this encounter.      Meds This Visit:  Requested Prescriptions     Signed Prescriptions Disp Refills    diclofenac 1 % External Gel 60 g 2     Sig: Apply 4 g topically 4 (four) times daily. Apply to affected area(s).    predniSONE 5 MG Oral Tab 35 tablet 0     Si tab po Q day for 5 days, then 2 tab po Q day for 5 days , then 1 tab  po Q day for 5 days    semaglutide 4 MG/3ML Subcutaneous Solution Pen-injector 6 mL 3     Sig: Inject 2 mg into the skin once a week.       Imaging & Referrals:  None         DANIS CORNEJO MD       [1]   Past Medical History:   Arrhythmia    Congestive heart disease (HCC)    Diabetes (HCC)    Essential hypertension    High blood pressure    High cholesterol    Prostate infection   [2] No past surgical history on file.  [3]   Family History  Problem Relation Age of Onset    Cancer Mother     Hypertension Sister     Lipids Sister    [4]   Social  History  Socioeconomic History    Marital status:    Tobacco Use    Smoking status: Former     Passive exposure: Never    Smokeless tobacco: Never   Vaping Use    Vaping status: Never Used   Substance and Sexual Activity    Alcohol use: Yes     Comment: rare    Drug use: Never   [5]   Current Outpatient Medications   Medication Sig Dispense Refill    diclofenac 1 % External Gel Apply 4 g topically 4 (four) times daily. Apply to affected area(s). 60 g 2    predniSONE 5 MG Oral Tab 4 tab po Q day for 5 days, then 2 tab po Q day for 5 days , then 1 tab  po Q day for 5 days 35 tablet 0    semaglutide 4 MG/3ML Subcutaneous Solution Pen-injector Inject 2 mg into the skin once a week. 6 mL 3    naproxen 500 MG Oral Tab Take 1 tablet (500 mg total) by mouth 2 (two) times daily with meals. 60 tablet 2    amLODIPine 10 MG Oral Tab Take 1 tablet (10 mg total) by mouth daily.      simvastatin 20 MG Oral Tab Take 1 tablet (20 mg total) by mouth nightly.      dabigatran 150 MG Oral Cap Take 1 capsule (150 mg total) by mouth 2 (two) times daily. 180 capsule 1    Omeprazole 40 MG Oral Capsule Delayed Release Take 1 capsule (40 mg total) by mouth daily. 90 capsule 3    nebivolol (BYSTOLIC) 10 MG Oral Tab Take 0.5 tablets (5 mg total) by mouth daily. 90 tablet 0    SYNJARDY 12.5-1000 MG Oral Tab TAKE 1 TABLET BY MOUTH TWICE DAILY 180 tablet 3    Accu-Chek Softclix Lancets Does not apply Misc Use  each 3    Glucose Blood (ACCU-CHEK ANDREW PLUS) In Vitro Strip 1 lancet  by Finger stick route 2 (two) times daily. 100 strip 2    lisinopril 20 MG Oral Tab Take 1 tablet (20 mg total) by mouth daily. 90 tablet 0    torsemide 20 MG Oral Tab Take 1 tablet (20 mg total) by mouth daily. 90 tablet 3   [6] No Known Allergies  [7]   diclofenac 1 % External Gel, Apply 4 g topically 4 (four) times daily. Apply to affected area(s)., Disp: 60 g, Rfl: 2    predniSONE 5 MG Oral Tab, 4 tab po Q day for 5 days, then 2 tab po Q day for 5 days  , then 1 tab  po Q day for 5 days, Disp: 35 tablet, Rfl: 0    semaglutide 4 MG/3ML Subcutaneous Solution Pen-injector, Inject 2 mg into the skin once a week., Disp: 6 mL, Rfl: 3    naproxen 500 MG Oral Tab, Take 1 tablet (500 mg total) by mouth 2 (two) times daily with meals., Disp: 60 tablet, Rfl: 2    amLODIPine 10 MG Oral Tab, Take 1 tablet (10 mg total) by mouth daily., Disp: , Rfl:     simvastatin 20 MG Oral Tab, Take 1 tablet (20 mg total) by mouth nightly., Disp: , Rfl:     dabigatran 150 MG Oral Cap, Take 1 capsule (150 mg total) by mouth 2 (two) times daily., Disp: 180 capsule, Rfl: 1    Omeprazole 40 MG Oral Capsule Delayed Release, Take 1 capsule (40 mg total) by mouth daily., Disp: 90 capsule, Rfl: 3    nebivolol (BYSTOLIC) 10 MG Oral Tab, Take 0.5 tablets (5 mg total) by mouth daily., Disp: 90 tablet, Rfl: 0    SYNJARDY 12.5-1000 MG Oral Tab, TAKE 1 TABLET BY MOUTH TWICE DAILY, Disp: 180 tablet, Rfl: 3    Accu-Chek Softclix Lancets Does not apply Misc, Use BID, Disp: 100 each, Rfl: 3    Glucose Blood (ACCU-CHEK ANDREW PLUS) In Vitro Strip, 1 lancet  by Finger stick route 2 (two) times daily., Disp: 100 strip, Rfl: 2    lisinopril 20 MG Oral Tab, Take 1 tablet (20 mg total) by mouth daily., Disp: 90 tablet, Rfl: 0    torsemide 20 MG Oral Tab, Take 1 tablet (20 mg total) by mouth daily., Disp: 90 tablet, Rfl: 3

## 2025-06-18 ENCOUNTER — TELEPHONE (OUTPATIENT)
Dept: FAMILY MEDICINE CLINIC | Facility: CLINIC | Age: 70
End: 2025-06-18

## 2025-06-18 DIAGNOSIS — E11.9 TYPE 2 DIABETES MELLITUS WITHOUT COMPLICATION, UNSPECIFIED WHETHER LONG TERM INSULIN USE (HCC): ICD-10-CM

## 2025-06-18 DIAGNOSIS — I25.10 CORONARY ARTERY DISEASE INVOLVING NATIVE HEART WITHOUT ANGINA PECTORIS, UNSPECIFIED VESSEL OR LESION TYPE: ICD-10-CM

## 2025-06-18 RX ORDER — PREDNISONE 5 MG/1
TABLET ORAL
Qty: 35 TABLET | Refills: 0 | Status: SHIPPED | OUTPATIENT
Start: 2025-06-18

## 2025-06-18 RX ORDER — TORSEMIDE 20 MG/1
20 TABLET ORAL DAILY
Qty: 90 TABLET | Refills: 3 | Status: SHIPPED | OUTPATIENT
Start: 2025-06-18

## 2025-06-18 RX ORDER — LISINOPRIL 20 MG/1
20 TABLET ORAL DAILY
Qty: 90 TABLET | Refills: 3 | Status: SHIPPED | OUTPATIENT
Start: 2025-06-18

## 2025-06-18 RX ORDER — OMEPRAZOLE 40 MG/1
40 CAPSULE, DELAYED RELEASE ORAL DAILY
Qty: 90 CAPSULE | Refills: 3 | Status: SHIPPED | OUTPATIENT
Start: 2025-06-18

## 2025-06-18 RX ORDER — NAPROXEN 500 MG/1
500 TABLET ORAL 2 TIMES DAILY WITH MEALS
Qty: 180 TABLET | Refills: 2 | Status: SHIPPED | OUTPATIENT
Start: 2025-06-18

## 2025-06-18 RX ORDER — AMLODIPINE BESYLATE 10 MG/1
10 TABLET ORAL DAILY
Qty: 90 TABLET | Refills: 3 | Status: SHIPPED | OUTPATIENT
Start: 2025-06-18

## 2025-06-18 RX ORDER — LANCETS
EACH MISCELLANEOUS
Qty: 100 EACH | Refills: 3 | Status: SHIPPED | OUTPATIENT
Start: 2025-06-18

## 2025-06-18 RX ORDER — DABIGATRAN ETEXILATE 150 MG/1
150 CAPSULE ORAL 2 TIMES DAILY
Qty: 180 CAPSULE | Refills: 2 | Status: SHIPPED | OUTPATIENT
Start: 2025-06-18

## 2025-06-18 RX ORDER — NEBIVOLOL 10 MG/1
5 TABLET ORAL DAILY
Qty: 90 TABLET | Refills: 2 | Status: SHIPPED | OUTPATIENT
Start: 2025-06-18

## 2025-06-18 RX ORDER — EMPAGLIFLOZIN AND METFORMIN HYDROCHLORIDE 12.5; 1 MG/1; MG/1
1 TABLET ORAL 2 TIMES DAILY
Qty: 180 TABLET | Refills: 3 | Status: SHIPPED | OUTPATIENT
Start: 2025-06-18

## 2025-06-18 RX ORDER — SIMVASTATIN 20 MG
20 TABLET ORAL NIGHTLY
Qty: 90 TABLET | Refills: 3 | Status: SHIPPED | OUTPATIENT
Start: 2025-06-18

## 2025-06-18 NOTE — TELEPHONE ENCOUNTER
Patient in office today requesting 3 month supply on medications  given to him on 6/2/25  [place].Needs refills for July Agust and Sept. Pls advs    Current Outpatient Medications:     diclofenac 1 % External Gel, Apply 4 g topically 4 (four) times daily. Apply to affected area(s)., Disp: 60 g, Rfl: 2    predniSONE 5 MG Oral Tab, 4 tab po Q day for 5 days, then 2 tab po Q day for 5 days , then 1 tab  po Q day for 5 days, Disp: 35 tablet, Rfl: 0    semaglutide 4 MG/3ML Subcutaneous Solution Pen-injector, Inject 2 mg into the skin once a week., Disp: 6 mL, Rfl: 3    naproxen 500 MG Oral Tab, Take 1 tablet (500 mg total) by mouth 2 (two) times daily with meals., Disp: 60 tablet, Rfl: 2    amLODIPine 10 MG Oral Tab, Take 1 tablet (10 mg total) by mouth daily., Disp: , Rfl:     simvastatin 20 MG Oral Tab, Take 1 tablet (20 mg total) by mouth nightly., Disp: , Rfl:     dabigatran 150 MG Oral Cap, Take 1 capsule (150 mg total) by mouth 2 (two) times daily., Disp: 180 capsule, Rfl: 1    Omeprazole 40 MG Oral Capsule Delayed Release, Take 1 capsule (40 mg total) by mouth daily., Disp: 90 capsule, Rfl: 3    nebivolol (BYSTOLIC) 10 MG Oral Tab, Take 0.5 tablets (5 mg total) by mouth daily., Disp: 90 tablet, Rfl: 0    SYNJARDY 12.5-1000 MG Oral Tab, TAKE 1 TABLET BY MOUTH TWICE DAILY, Disp: 180 tablet, Rfl: 3    Accu-Chek Softclix Lancets Does not apply Misc, Use BID, Disp: 100 each, Rfl: 3    Glucose Blood (ACCU-CHEK ANDREW PLUS) In Vitro Strip, 1 lancet  by Finger stick route 2 (two) times daily., Disp: 100 strip, Rfl: 2    lisinopril 20 MG Oral Tab, Take 1 tablet (20 mg total) by mouth daily., Disp: 90 tablet, Rfl: 0    torsemide 20 MG Oral Tab, Take 1 tablet (20 mg total) by mouth daily., Disp: 90 tablet, Rfl: 3

## 2025-06-18 NOTE — TELEPHONE ENCOUNTER
Patient also needs test strip but unable to pend them due to not being manufactured anymore pls send alternative    No difficulties

## (undated) NOTE — Clinical Note
Dear Tejas Arias,    Thank you for sending Dallas James to see me for electrodiagnostic consultation. I appreciate your confidence in me to care for your patients. Please feel free call me with any questions at [phone] or contact me through [address] [place] [address] Rd.     [facility]

## (undated) NOTE — LETTER
02/26/20        Jese Ramirez  [address] [address]      Dear Antonio Urbina,    [facility] records indicate that you have outstanding lab work and or testing that was ordered for you and has not yet been completed:  Orders Placed This Encount

## (undated) NOTE — LETTER
ENCOUNTER  Patient Class:   Admitting Provider: No admitting provider for patient encounter. Unit:     Hospital Service: No service for patient encounter. Attending Provider: No current attending provider for patient encounter.  Bed:     Visit Type: 10/3/1955 [id number]    Guarantor Name (ID) Guarantor Birth Date Guarantor Address Guarantor Type   GIORGIO FRASER ([id number]) 10/3/1955 [address] Order Date: Mar 12, 2021 at 11:35 AM  Ordering Department: [facility]        Sleep Study [id number] 1/10/2021 10:46 AM Royal Jones MD   Signed by Marcela Quispe MD on 01/11/21 at 18                  [facility]  Accredited by alex equivalent to 98% of the testing, and 12 minutes in the non-supine position, equivalent to 2% of the testing.   The average heart rate was 52 beats per minute and the maximum heart rate 83 beats per minute.     INTERPRETATION:  The data generated from this EKG, nasal airflow, chest and abdominal wall motion, and oximetry. I have reviewed the entirety of the raw data of this study. During the study, the lights-out clock time is 10:38 p.m., the lights-on clock time is 12:18 a.m.    The total time in bed is 10 5.       The patient should not drive if at all sleepy.    6.       Download data from the respiratory device at the 1-month period of time to assess for efficacy and compliance.      Please do not hesitate to contact me if there is any question whatsoever